# Patient Record
Sex: MALE | Race: WHITE | Employment: FULL TIME | URBAN - NONMETROPOLITAN AREA
[De-identification: names, ages, dates, MRNs, and addresses within clinical notes are randomized per-mention and may not be internally consistent; named-entity substitution may affect disease eponyms.]

---

## 2018-08-03 ENCOUNTER — HOSPITAL ENCOUNTER (EMERGENCY)
Age: 48
Discharge: HOME | End: 2018-08-03

## 2018-08-03 ENCOUNTER — APPOINTMENT (OUTPATIENT)
Dept: CT IMAGING | Age: 48
End: 2018-08-03

## 2018-08-03 ENCOUNTER — APPOINTMENT (OUTPATIENT)
Dept: GENERAL RADIOLOGY | Age: 48
End: 2018-08-03

## 2018-08-03 VITALS
WEIGHT: 275 LBS | HEIGHT: 72 IN | OXYGEN SATURATION: 97 % | TEMPERATURE: 97.9 F | BODY MASS INDEX: 37.25 KG/M2 | DIASTOLIC BLOOD PRESSURE: 100 MMHG | SYSTOLIC BLOOD PRESSURE: 171 MMHG | HEART RATE: 79 BPM | RESPIRATION RATE: 16 BRPM

## 2018-08-03 DIAGNOSIS — R03.0 ELEVATED BLOOD PRESSURE READING WITHOUT DIAGNOSIS OF HYPERTENSION: ICD-10-CM

## 2018-08-03 DIAGNOSIS — R10.32 LEFT GROIN PAIN: Primary | ICD-10-CM

## 2018-08-03 LAB
ANION GAP SERPL CALCULATED.3IONS-SCNC: 13 MEQ/L (ref 8–16)
BASOPHILS # BLD: 0.6 %
BASOPHILS ABSOLUTE: 0 THOU/MM3 (ref 0–0.1)
BUN BLDV-MCNC: 10 MG/DL (ref 7–22)
CALCIUM SERPL-MCNC: 9.3 MG/DL (ref 8.5–10.5)
CHLORIDE BLD-SCNC: 103 MEQ/L (ref 98–111)
CO2: 25 MEQ/L (ref 23–33)
CREAT SERPL-MCNC: 0.9 MG/DL (ref 0.4–1.2)
EOSINOPHIL # BLD: 3.1 %
EOSINOPHILS ABSOLUTE: 0.2 THOU/MM3 (ref 0–0.4)
ERYTHROCYTE [DISTWIDTH] IN BLOOD BY AUTOMATED COUNT: 12.7 % (ref 11.5–14.5)
ERYTHROCYTE [DISTWIDTH] IN BLOOD BY AUTOMATED COUNT: 42.7 FL (ref 35–45)
GFR SERPL CREATININE-BSD FRML MDRD: > 90 ML/MIN/1.73M2
GLUCOSE BLD-MCNC: 104 MG/DL (ref 70–108)
HCT VFR BLD CALC: 43 % (ref 42–52)
HEMOGLOBIN: 15.3 GM/DL (ref 14–18)
IMMATURE GRANS (ABS): 0.02 THOU/MM3 (ref 0–0.07)
IMMATURE GRANULOCYTES: 0.3 %
LYMPHOCYTES # BLD: 22.3 %
LYMPHOCYTES ABSOLUTE: 1.5 THOU/MM3 (ref 1–4.8)
MCH RBC QN AUTO: 32.8 PG (ref 26–33)
MCHC RBC AUTO-ENTMCNC: 35.6 GM/DL (ref 32.2–35.5)
MCV RBC AUTO: 92.1 FL (ref 80–94)
MONOCYTES # BLD: 8.8 %
MONOCYTES ABSOLUTE: 0.6 THOU/MM3 (ref 0.4–1.3)
NUCLEATED RED BLOOD CELLS: 0 /100 WBC
OSMOLALITY CALCULATION: 280.6 MOSMOL/KG (ref 275–300)
PLATELET # BLD: 179 THOU/MM3 (ref 130–400)
PMV BLD AUTO: 11.4 FL (ref 9.4–12.4)
POTASSIUM SERPL-SCNC: 4.5 MEQ/L (ref 3.5–5.2)
RBC # BLD: 4.67 MILL/MM3 (ref 4.7–6.1)
SEG NEUTROPHILS: 64.9 %
SEGMENTED NEUTROPHILS ABSOLUTE COUNT: 4.4 THOU/MM3 (ref 1.8–7.7)
SODIUM BLD-SCNC: 141 MEQ/L (ref 135–145)
WBC # BLD: 6.8 THOU/MM3 (ref 4.8–10.8)

## 2018-08-03 PROCEDURE — 85025 COMPLETE CBC W/AUTO DIFF WBC: CPT

## 2018-08-03 PROCEDURE — 74177 CT ABD & PELVIS W/CONTRAST: CPT

## 2018-08-03 PROCEDURE — 80048 BASIC METABOLIC PNL TOTAL CA: CPT

## 2018-08-03 PROCEDURE — 36415 COLL VENOUS BLD VENIPUNCTURE: CPT

## 2018-08-03 PROCEDURE — 6360000004 HC RX CONTRAST MEDICATION: Performed by: PHYSICIAN ASSISTANT

## 2018-08-03 PROCEDURE — 73502 X-RAY EXAM HIP UNI 2-3 VIEWS: CPT

## 2018-08-03 PROCEDURE — 99284 EMERGENCY DEPT VISIT MOD MDM: CPT

## 2018-08-03 RX ORDER — NAPROXEN 500 MG/1
500 TABLET ORAL 2 TIMES DAILY
Qty: 60 TABLET | Refills: 0 | Status: SHIPPED | OUTPATIENT
Start: 2018-08-03 | End: 2018-08-23 | Stop reason: ALTCHOICE

## 2018-08-03 RX ORDER — MORPHINE SULFATE 4 MG/ML
4 INJECTION, SOLUTION INTRAMUSCULAR; INTRAVENOUS ONCE
Status: DISCONTINUED | OUTPATIENT
Start: 2018-08-03 | End: 2018-08-03 | Stop reason: HOSPADM

## 2018-08-03 RX ORDER — ONDANSETRON 4 MG/1
4 TABLET, ORALLY DISINTEGRATING ORAL ONCE
Status: DISCONTINUED | OUTPATIENT
Start: 2018-08-03 | End: 2018-08-03 | Stop reason: HOSPADM

## 2018-08-03 RX ORDER — GABAPENTIN 300 MG/1
300 CAPSULE ORAL 3 TIMES DAILY
Qty: 30 CAPSULE | Refills: 0 | Status: SHIPPED | OUTPATIENT
Start: 2018-08-03 | End: 2018-08-23 | Stop reason: ALTCHOICE

## 2018-08-03 RX ADMIN — IOPAMIDOL 80 ML: 755 INJECTION, SOLUTION INTRAVENOUS at 14:20

## 2018-08-03 ASSESSMENT — ENCOUNTER SYMPTOMS
WHEEZING: 0
SHORTNESS OF BREATH: 0
RHINORRHEA: 0
DIARRHEA: 0
NAUSEA: 0
COUGH: 0
SORE THROAT: 0
CONSTIPATION: 0
BLOOD IN STOOL: 0
ABDOMINAL PAIN: 0
VOMITING: 0
BACK PAIN: 1

## 2018-08-03 ASSESSMENT — PAIN DESCRIPTION - ORIENTATION: ORIENTATION: LEFT

## 2018-08-03 ASSESSMENT — PAIN DESCRIPTION - DESCRIPTORS: DESCRIPTORS: ACHING;SHARP

## 2018-08-03 ASSESSMENT — PAIN DESCRIPTION - LOCATION: LOCATION: HIP;LEG;GROIN

## 2018-08-03 ASSESSMENT — PAIN SCALES - GENERAL: PAINLEVEL_OUTOF10: 10

## 2018-08-03 ASSESSMENT — PAIN DESCRIPTION - PAIN TYPE: TYPE: ACUTE PAIN

## 2018-08-03 NOTE — ED NOTES
Pt presents to ED and states that for the last couple of weeks he has had this really bad pain that starts in his left groin and shoots down his leg. States when he wakes up in the morning is when it is the worst. Has a hx of hernia repair with mesh about 5 years ago.       Luisa Serrato RN  08/03/18 7081

## 2018-08-03 NOTE — ED PROVIDER NOTES
and breath sounds normal. No respiratory distress. He has no wheezes. He has no rales. Abdominal: Soft. Bowel sounds are normal. He exhibits no distension. There is no tenderness. There is no rebound. No hernia. Hernia confirmed negative in the left inguinal area. Tenderness left lateral groin. No swelling or bulging. Musculoskeletal: Normal range of motion. He exhibits no edema or deformity. Neurological: He is alert and oriented to person, place, and time. Skin: Skin is warm and dry. No erythema. Nursing note and vitals reviewed. DIFFERENTIAL DIAGNOSIS:   Differential diagnosis discussed with the patient and available family at the patient's bedside including but not limited to hernia, strain,      DIAGNOSTIC RESULTS     RADIOLOGY: non-plain film images(s) such as CT, Ultrasound and MRI are read by the radiologist.  @[x] Visualized and interpreted by me  And My attending   [x] Radiologist's Wet Read Report Reviewed   [] Discussed with Radiologist.  [] Will be officially read by the radiologist at a later time. A Wet Read was entered by me. The patient had a   XR HIP LEFT (2-3 VIEWS)   Final Result   No acute fracture or dislocation. **This report has been created using voice recognition software. It may contain minor errors which are inherent in voice recognition technology. **      Final report electronically signed by Dr. Cece Boyd on 8/3/2018 3:42 PM      CT ABDOMEN PELVIS W IV CONTRAST Additional Contrast? Radiologist Recommendation   Final Result   Minimal heterogeneous increased density in the left inguinal canal which may simply represent fibrotic scarring post surgery. No other mass or abnormality identified. **This report has been created using voice recognition software. It may contain minor errors which are inherent in voice recognition technology. **      Final report electronically signed by Dr. Tiny Weber on 8/3/2018 3:12 PM           LABS:   Results department. Patient's laboratory values, imaging studies and physical examination findings were all reassuring. The patient was treated with Zofran and Morphine while in the ED. Upon re-evaluation, the patient is feeling better with a benign repeat examination. Patient was comfortable with the plan of discharge home to followup with the primary care provider and the provided general surgeon in the next 2 days. The patient is instructed to return to the emergency department for any worsening of their symptoms. Patient was discharged from the emergency department in good condition with all questions answered. See disposition below      Medications   morphine injection 4 mg (4 mg Intravenous Not Given 8/3/18 1526)   ondansetron (ZOFRAN-ODT) disintegrating tablet 4 mg (4 mg Oral Not Given 8/3/18 1527)   iopamidol (ISOVUE-370) 76 % injection 80 mL (80 mLs Intravenous Given 8/3/18 1420)       CRITICAL CARE:   None    CONSULTS:  None    PROCEDURES:  None    FINAL IMPRESSION      1. Left groin pain    2. Elevated blood pressure reading without diagnosis of hypertension          DISPOSITION/PLAN   Discharged     PATIENT REFERRED TO:  Jean Paul Higgins MD  [address]  [phone]    Schedule an appointment as soon as possible for a visit in 1 week        DISCHARGE MEDICATIONS:  New Prescriptions    GABAPENTIN (NEURONTIN) 300 MG CAPSULE    Take 1 capsule by mouth 3 times daily for 10 days. Demetria Lima NAPROXEN (NAPROSYN) 500 MG TABLET    Take 1 tablet by mouth 2 times daily       (Please note that portions of this note were completed with a voice recognition program.  Efforts were made to edit the dictations but occasionally words are mis-transcribed.)    This patient was seen independently by Calixto Brody PA-C a Mid-Level Provider in the [facility] Emergency Department. The patient was given an opportunity to see the Emergency Attending.  The patient voiced understanding that I was a Mid-Level Provider and was in agreement with being seen independently by myself. Signed by: Aruna Gaytan, 08/03/18 4:28 PM    Scribe:  Renate Moy 8/3/18 12:50 PM Scribing for and in the presence of Chayito Lyles PA-C. Provider:  I personally performed the services described in the documentation, reviewed and edited the documentation which was dictated to the scribe in my presence, and it accurately records my words and actions.     Chayito Lyles PA-C 8/3/18 4:28 PM            VALDO Iraheta  08/03/18 1715

## 2018-08-03 NOTE — ED NOTES
Pt sitting up and chair updated on plan of care.  Pt states he does not follow with a family dr and hasnt had his BP checked in a while      Amrita Keith RN  08/03/18 9584

## 2018-08-23 ENCOUNTER — OFFICE VISIT (OUTPATIENT)
Dept: SURGERY | Age: 48
End: 2018-08-23

## 2018-08-23 VITALS
BODY MASS INDEX: 40.71 KG/M2 | RESPIRATION RATE: 20 BRPM | WEIGHT: 300.6 LBS | SYSTOLIC BLOOD PRESSURE: 130 MMHG | TEMPERATURE: 96.4 F | OXYGEN SATURATION: 94 % | DIASTOLIC BLOOD PRESSURE: 62 MMHG | HEIGHT: 72 IN | HEART RATE: 74 BPM

## 2018-08-23 DIAGNOSIS — R10.32 INGUINAL PAIN, LEFT: Primary | ICD-10-CM

## 2018-08-23 PROCEDURE — 99213 OFFICE O/P EST LOW 20 MIN: CPT | Performed by: SURGERY

## 2018-08-23 ASSESSMENT — ENCOUNTER SYMPTOMS
SORE THROAT: 0
SHORTNESS OF BREATH: 0
COLOR CHANGE: 0
ABDOMINAL DISTENTION: 0
SINUS PAIN: 0
ABDOMINAL PAIN: 0
FACIAL SWELLING: 0
RHINORRHEA: 0
SINUS PRESSURE: 0
COUGH: 0
PHOTOPHOBIA: 0
EYE DISCHARGE: 0
BLOOD IN STOOL: 0
APNEA: 0
VOICE CHANGE: 0
WHEEZING: 0
CHEST TIGHTNESS: 0
ANAL BLEEDING: 0
BACK PAIN: 0
EYE ITCHING: 0
TROUBLE SWALLOWING: 0
EYE PAIN: 0
CONSTIPATION: 0
CHOKING: 0
STRIDOR: 0
EYE REDNESS: 0

## 2018-08-23 NOTE — PROGRESS NOTES
agitation, behavioral problems, confusion, decreased concentration, dysphoric mood, hallucinations, self-injury, sleep disturbance and suicidal ideas. The patient is not nervous/anxious and is not hyperactive. Objective:   /62 (Site: Left Arm, Position: Sitting, Cuff Size: Medium Adult)   Pulse 74   Temp 96.4 °F (35.8 °C) (Tympanic)   Resp 20   Ht 6' (1.829 m)   Wt (!) 300 lb 9.6 oz (136.4 kg)   SpO2 94%   BMI 40.77 kg/m²     Physical Exam   Constitutional: He is oriented to person, place, and time. He appears well-developed and well-nourished. HENT:   Head: Normocephalic and atraumatic. Eyes: Pupils are equal, round, and reactive to light. Conjunctivae and EOM are normal. Left eye exhibits no discharge. No scleral icterus. Neck: No tracheal deviation present. No thyromegaly present. Cardiovascular: Normal rate, regular rhythm and normal heart sounds. Exam reveals no gallop and no friction rub. No murmur heard. Pulmonary/Chest: Effort normal and breath sounds normal. No respiratory distress. He has no wheezes. He has no rales. He exhibits no tenderness. Abdominal: He exhibits no distension. There is no tenderness. There is no rebound. No evidence of recurrent left inguinal hernia   Musculoskeletal: Normal range of motion. He exhibits no edema or tenderness. Lymphadenopathy:     He has no cervical adenopathy. Neurological: He is alert and oriented to person, place, and time. Skin: Skin is warm and dry. No rash noted. No erythema. No pallor. Psychiatric: He has a normal mood and affect.  His behavior is normal. Judgment and thought content normal.       Results for orders placed or performed during the hospital encounter of 08/03/18   CBC auto differential   Result Value Ref Range    WBC 6.8 4.8 - 10.8 thou/mm3    RBC 4.67 (L) 4.70 - 6.10 mill/mm3    Hemoglobin 15.3 14.0 - 18.0 gm/dl    Hematocrit 43.0 42.0 - 52.0 %    MCV 92.1 80.0 - 94.0 fL    MCH 32.8 26.0 - 33.0 pg

## 2019-04-11 ENCOUNTER — HOSPITAL ENCOUNTER (EMERGENCY)
Age: 49
Discharge: HOME | End: 2019-04-11

## 2019-04-11 VITALS
TEMPERATURE: 98.3 F | WEIGHT: 296 LBS | BODY MASS INDEX: 40.09 KG/M2 | HEART RATE: 76 BPM | HEIGHT: 72 IN | OXYGEN SATURATION: 98 % | RESPIRATION RATE: 16 BRPM | SYSTOLIC BLOOD PRESSURE: 145 MMHG | DIASTOLIC BLOOD PRESSURE: 79 MMHG

## 2019-04-11 DIAGNOSIS — S86.912A STRAIN OF LEFT KNEE, INITIAL ENCOUNTER: Primary | ICD-10-CM

## 2019-04-11 PROCEDURE — 99212 OFFICE O/P EST SF 10 MIN: CPT

## 2019-04-11 PROCEDURE — 99213 OFFICE O/P EST LOW 20 MIN: CPT | Performed by: NURSE PRACTITIONER

## 2019-04-11 RX ORDER — IBUPROFEN 600 MG/1
600 TABLET ORAL EVERY 6 HOURS PRN
COMMUNITY
End: 2021-06-23

## 2019-04-11 ASSESSMENT — PAIN SCALES - GENERAL: PAINLEVEL_OUTOF10: 2

## 2019-04-11 ASSESSMENT — PAIN DESCRIPTION - ORIENTATION: ORIENTATION: LEFT

## 2019-04-11 ASSESSMENT — ENCOUNTER SYMPTOMS
CHEST TIGHTNESS: 0
COLOR CHANGE: 0
SHORTNESS OF BREATH: 0

## 2019-04-11 ASSESSMENT — PAIN DESCRIPTION - FREQUENCY: FREQUENCY: CONTINUOUS

## 2019-04-11 ASSESSMENT — PAIN DESCRIPTION - LOCATION: LOCATION: KNEE

## 2019-04-11 ASSESSMENT — PAIN DESCRIPTION - PAIN TYPE: TYPE: ACUTE PAIN

## 2019-04-11 NOTE — ED PROVIDER NOTES
[facility]  Urgent Care Encounter       CHIEF COMPLAINT       Chief Complaint   Patient presents with    Knee Pain    Joint Swelling     right       Nurses Notes reviewed and I agree except as noted in the HPI. HISTORY OF PRESENT ILLNESS   Gilmar Robledo is a 57 y.o. male who presents for evaluation of left knee pain and swelling. Patient reports that this swelling and pain seems to be moving into his inner thigh as well. He denies any redness or heat to touch. He denies any known injury to the knee, however he states that this is been ongoing issue for over 4 weeks. The history is provided by the patient. REVIEW OF SYSTEMS     Review of Systems   Constitutional: Negative for chills and fever. Respiratory: Negative for chest tightness and shortness of breath. Cardiovascular: Negative for chest pain. Musculoskeletal: Positive for arthralgias and joint swelling. Negative for myalgias. Skin: Negative for color change, rash and wound. PAST MEDICAL HISTORY         Diagnosis Date    Hernia 2009    inguinal hernia       SURGICALHISTORY     Patient  has a past surgical history that includes Inguinal hernia repair (Left, 09/16/2013). CURRENT MEDICATIONS       Previous Medications    IBUPROFEN (ADVIL;MOTRIN) 600 MG TABLET    Take 600 mg by mouth every 6 hours as needed for Pain    MULTIPLE VITAMINS-MINERALS (MULTIVITAMIN ADULT PO)    Take 1 tablet by mouth daily       ALLERGIES     Patient is has No Known Allergies. Patients   There is no immunization history on file for this patient. FAMILY HISTORY     Patient's family history includes COPD in his mother; Heart Attack in his father; Heart Disease in his father; High Blood Pressure in his mother; Obesity in his mother. SOCIAL HISTORY     Patient  reports that he has been smoking cigarettes. He has a 10.00 pack-year smoking history. He has never used smokeless tobacco. He reports that he drinks alcohol.  He reports that he has current or past drug history. Drug: Marijuana. PHYSICAL EXAM     ED TRIAGE VITALS  BP: (!) 145/79, Temp: 98.3 °F (36.8 °C), Pulse: 76, Resp: 16, SpO2: 98 %,Estimated body mass index is 40.14 kg/m² as calculated from the following:    Height as of this encounter: 6' (1.829 m). Weight as of this encounter: 296 lb (134.3 kg). ,No LMP for male patient. Physical Exam   Constitutional: He is oriented to person, place, and time. He appears well-developed and well-nourished. No distress. Pulmonary/Chest: Effort normal. No respiratory distress. Musculoskeletal:        Left knee: He exhibits swelling (mild anterior). He exhibits normal range of motion and no ecchymosis. Tenderness found. Neurological: He is alert and oriented to person, place, and time. No cranial nerve deficit. Skin: Skin is warm and dry. No rash noted. He is not diaphoretic. Psychiatric: He has a normal mood and affect. Nursing note and vitals reviewed. DIAGNOSTIC RESULTS     Labs:No results found for this visit on 04/11/19. IMAGING:    No orders to display         EKG:  none    URGENT CARE COURSE:     Vitals:    04/11/19 1713   BP: (!) 145/79   Pulse: 76   Resp: 16   Temp: 98.3 °F (36.8 °C)   TempSrc: Temporal   SpO2: 98%   Weight: 296 lb (134.3 kg)   Height: 6' (1.829 m)       Medications - No data to display         PROCEDURES:  None    FINAL IMPRESSION      1. Strain of left knee, initial encounter          DISPOSITION/ PLAN       Physical exam does not reveal any excessive swelling to the thigh at this time. Discussed with the patient that we could do an x-ray of his knee, however I believe that this will be negative due to his lack of injury and the length of the symptoms. Discussed that this is likely more of a tendon type injury. Patient states that he currently does not have insurance and would prefer to not have an x-ray at this time.   Discussed the plan to provide an Ace wrap and for him to ice and rest as much as possible at home. He is instructed to use ibuprofen twice a day for the next 2 weeks. Patient states that he has an Ace wrap at home and will use this and begin icing. He is advised to [facility] [place] if symptoms do not improve or present directly to the ER. He has any worsening symptoms. He is agreeable to plan as discussed. PATIENT REFERRED TO:  No primary care provider on file. No primary physician on file.       DISCHARGE MEDICATIONS:  New Prescriptions    No medications on file       Discontinued Medications    No medications on file       Current Discharge Medication List          MENDEZ Alegria CNP    (Please note that portions of this note were completed with a voice recognition program. Efforts were made to edit the dictations but occasionally words are mis-transcribed.)          MENDEZ Alegria CNP  04/11/19 8443

## 2020-05-26 ENCOUNTER — HOSPITAL ENCOUNTER (EMERGENCY)
Age: 50
Discharge: HOME | End: 2020-05-27

## 2020-05-26 PROCEDURE — 82248 BILIRUBIN DIRECT: CPT

## 2020-05-26 PROCEDURE — 83690 ASSAY OF LIPASE: CPT

## 2020-05-26 PROCEDURE — 36415 COLL VENOUS BLD VENIPUNCTURE: CPT

## 2020-05-26 PROCEDURE — 85025 COMPLETE CBC W/AUTO DIFF WBC: CPT

## 2020-05-26 PROCEDURE — 80053 COMPREHEN METABOLIC PANEL: CPT

## 2020-05-26 PROCEDURE — 99283 EMERGENCY DEPT VISIT LOW MDM: CPT

## 2020-05-26 RX ORDER — KETOROLAC TROMETHAMINE 30 MG/ML
30 INJECTION, SOLUTION INTRAMUSCULAR; INTRAVENOUS ONCE
Status: DISCONTINUED | OUTPATIENT
Start: 2020-05-27 | End: 2020-05-27 | Stop reason: HOSPADM

## 2020-05-26 RX ORDER — 0.9 % SODIUM CHLORIDE 0.9 %
1000 INTRAVENOUS SOLUTION INTRAVENOUS ONCE
Status: COMPLETED | OUTPATIENT
Start: 2020-05-27 | End: 2020-05-27

## 2020-05-26 ASSESSMENT — ENCOUNTER SYMPTOMS
NAUSEA: 0
BACK PAIN: 0
SHORTNESS OF BREATH: 0
COUGH: 0
SORE THROAT: 0
ABDOMINAL PAIN: 1
VOMITING: 0
RHINORRHEA: 0
DIARRHEA: 0

## 2020-05-26 ASSESSMENT — PAIN DESCRIPTION - FREQUENCY: FREQUENCY: CONTINUOUS

## 2020-05-26 ASSESSMENT — PAIN DESCRIPTION - ONSET: ONSET: AWAKENED FROM SLEEP

## 2020-05-26 ASSESSMENT — PAIN DESCRIPTION - ORIENTATION: ORIENTATION: LEFT

## 2020-05-26 ASSESSMENT — PAIN SCALES - GENERAL: PAINLEVEL_OUTOF10: 5

## 2020-05-26 ASSESSMENT — PAIN DESCRIPTION - DESCRIPTORS: DESCRIPTORS: CRAMPING;PRESSURE

## 2020-05-26 ASSESSMENT — PAIN DESCRIPTION - LOCATION: LOCATION: ABDOMEN

## 2020-05-27 ENCOUNTER — APPOINTMENT (OUTPATIENT)
Dept: CT IMAGING | Age: 50
End: 2020-05-27

## 2020-05-27 VITALS
OXYGEN SATURATION: 95 % | HEART RATE: 67 BPM | BODY MASS INDEX: 38.6 KG/M2 | SYSTOLIC BLOOD PRESSURE: 183 MMHG | TEMPERATURE: 98.8 F | DIASTOLIC BLOOD PRESSURE: 108 MMHG | RESPIRATION RATE: 16 BRPM | HEIGHT: 72 IN | WEIGHT: 285 LBS

## 2020-05-27 LAB
ALBUMIN SERPL-MCNC: 4.3 G/DL (ref 3.5–5.1)
ALP BLD-CCNC: 85 U/L (ref 38–126)
ALT SERPL-CCNC: 17 U/L (ref 11–66)
ANION GAP SERPL CALCULATED.3IONS-SCNC: 13 MEQ/L (ref 8–16)
AST SERPL-CCNC: 18 U/L (ref 5–40)
BACTERIA: ABNORMAL /HPF
BASOPHILS # BLD: 0.7 %
BASOPHILS ABSOLUTE: 0.1 THOU/MM3 (ref 0–0.1)
BILIRUB SERPL-MCNC: 0.5 MG/DL (ref 0.3–1.2)
BILIRUBIN DIRECT: < 0.2 MG/DL (ref 0–0.3)
BILIRUBIN URINE: NEGATIVE
BLOOD, URINE: ABNORMAL
BUN BLDV-MCNC: 10 MG/DL (ref 7–22)
CALCIUM SERPL-MCNC: 9.3 MG/DL (ref 8.5–10.5)
CASTS 2: ABNORMAL /LPF
CASTS UA: ABNORMAL /LPF
CHARACTER, URINE: CLEAR
CHLORIDE BLD-SCNC: 101 MEQ/L (ref 98–111)
CO2: 25 MEQ/L (ref 23–33)
COLOR: YELLOW
CREAT SERPL-MCNC: 0.9 MG/DL (ref 0.4–1.2)
CRYSTALS, UA: ABNORMAL
EOSINOPHIL # BLD: 2.3 %
EOSINOPHILS ABSOLUTE: 0.2 THOU/MM3 (ref 0–0.4)
EPITHELIAL CELLS, UA: ABNORMAL /HPF
ERYTHROCYTE [DISTWIDTH] IN BLOOD BY AUTOMATED COUNT: 13.1 % (ref 11.5–14.5)
ERYTHROCYTE [DISTWIDTH] IN BLOOD BY AUTOMATED COUNT: 44.4 FL (ref 35–45)
GFR SERPL CREATININE-BSD FRML MDRD: > 90 ML/MIN/1.73M2
GLUCOSE BLD-MCNC: 110 MG/DL (ref 70–108)
GLUCOSE URINE: NEGATIVE MG/DL
HCT VFR BLD CALC: 43.1 % (ref 42–52)
HEMOGLOBIN: 15.1 GM/DL (ref 14–18)
IMMATURE GRANS (ABS): 0.02 THOU/MM3 (ref 0–0.07)
IMMATURE GRANULOCYTES: 0.2 %
KETONES, URINE: ABNORMAL
LEUKOCYTE ESTERASE, URINE: ABNORMAL
LIPASE: 22.4 U/L (ref 5.6–51.3)
LYMPHOCYTES # BLD: 20.4 %
LYMPHOCYTES ABSOLUTE: 1.7 THOU/MM3 (ref 1–4.8)
MCH RBC QN AUTO: 32.5 PG (ref 26–33)
MCHC RBC AUTO-ENTMCNC: 35 GM/DL (ref 32.2–35.5)
MCV RBC AUTO: 92.9 FL (ref 80–94)
MISCELLANEOUS 2: ABNORMAL
MONOCYTES # BLD: 7.7 %
MONOCYTES ABSOLUTE: 0.6 THOU/MM3 (ref 0.4–1.3)
NITRITE, URINE: NEGATIVE
NUCLEATED RED BLOOD CELLS: 0 /100 WBC
OSMOLALITY CALCULATION: 277.2 MOSMOL/KG (ref 275–300)
PH UA: 6 (ref 5–9)
PLATELET # BLD: 178 THOU/MM3 (ref 130–400)
PMV BLD AUTO: 11 FL (ref 9.4–12.4)
POTASSIUM SERPL-SCNC: 3.6 MEQ/L (ref 3.5–5.2)
PROTEIN UA: ABNORMAL
RBC # BLD: 4.64 MILL/MM3 (ref 4.7–6.1)
RBC URINE: ABNORMAL /HPF
RENAL EPITHELIAL, UA: ABNORMAL
SEG NEUTROPHILS: 68.7 %
SEGMENTED NEUTROPHILS ABSOLUTE COUNT: 5.6 THOU/MM3 (ref 1.8–7.7)
SODIUM BLD-SCNC: 139 MEQ/L (ref 135–145)
SPECIFIC GRAVITY, URINE: 1.02 (ref 1–1.03)
TOTAL PROTEIN: 7 G/DL (ref 6.1–8)
UROBILINOGEN, URINE: 1 EU/DL (ref 0–1)
WBC # BLD: 8.2 THOU/MM3 (ref 4.8–10.8)
WBC UA: ABNORMAL /HPF
YEAST: ABNORMAL

## 2020-05-27 PROCEDURE — 2580000003 HC RX 258: Performed by: NURSE PRACTITIONER

## 2020-05-27 PROCEDURE — 81001 URINALYSIS AUTO W/SCOPE: CPT

## 2020-05-27 PROCEDURE — 74176 CT ABD & PELVIS W/O CONTRAST: CPT

## 2020-05-27 PROCEDURE — 87086 URINE CULTURE/COLONY COUNT: CPT

## 2020-05-27 RX ORDER — CYCLOBENZAPRINE HCL 10 MG
10 TABLET ORAL 3 TIMES DAILY PRN
Qty: 12 TABLET | Refills: 0 | Status: SHIPPED | OUTPATIENT
Start: 2020-05-27 | End: 2020-06-06

## 2020-05-27 RX ADMIN — SODIUM CHLORIDE 1000 ML: 9 INJECTION, SOLUTION INTRAVENOUS at 00:03

## 2020-05-27 NOTE — ED PROVIDER NOTES
reading in the emergency department. I recommend that the patient call the primary care provider listed on their discharge instructions or a physician of their choice this week to arrange follow up for further evaluation of possible pre-hypertension or hypertension. Strict return precautions and follow up instructions were discussed with the patient with which the patient agrees     Physical assessment findings, diagnostic testing(s) if applicable, and vital signs reviewed with patient/patient representative. Questions answered. Medications asdirected, including OTC medications for supportive care. Education provided on medications. Differential diagnosis(s) discussed with patient/patient representative. Home care/self care instructions reviewed withpatient/patient representative. Patient is to follow-up with family care provider in 2-3 days if no improvement. Patient is to go to the emergency department if symptoms worsen. Patient/patient representative isaware of care plan, questions answered, verbalizes understanding and is in agreement. ED Medications administered this visit:   Medications   0.9 % sodium chloride bolus (0 mLs Intravenous Stopped 5/27/20 0202)           I have given the patient strict written and verbal instructions about care at home,follow-up, and signs and symptoms of worsening of condition and they did verbalize understanding. Patient was seen independently by myself. The Patient's final impression and disposition and plan was determined by myself. CRITICAL CARE:   none    CONSULTS:  None    PROCEDURES:  None    FINAL IMPRESSION      1. Flank pain    2. Elevated blood pressure reading    3. Muscle strain          DISPOSITION/PLAN   DISPOSITION Decision To Discharge 05/27/2020 02:20:03 AM        PATIENT REFERRED TO:  [address] [place] [address],[address]  [address] [address] [facility] [address]  Call today  For follow up      DISCHARGE MEDICATIONS:  Discharge Medication List as of 5/27/2020  2:21 AM      START taking these medications    Details   cyclobenzaprine (FLEXERIL) 10 MG tablet Take 1 tablet by mouth 3 times daily as needed for Muscle spasms, Disp-12 tablet, R-0Print             (Please note that portions of this note were completed with a voice recognition program.  Efforts were made to edit thedictations but occasionally words are mis-transcribed.)    MENDEZ Montgomery CNP, APRN - CNP  05/28/20 5192

## 2020-05-28 LAB
ORGANISM: ABNORMAL
URINE CULTURE REFLEX: ABNORMAL

## 2020-05-29 ENCOUNTER — VIRTUAL VISIT (OUTPATIENT)
Dept: FAMILY MEDICINE CLINIC | Age: 50
End: 2020-05-29

## 2020-05-29 PROCEDURE — 99203 OFFICE O/P NEW LOW 30 MIN: CPT | Performed by: NURSE PRACTITIONER

## 2020-05-29 RX ORDER — LISINOPRIL 10 MG/1
10 TABLET ORAL DAILY
Qty: 90 TABLET | Refills: 3 | Status: SHIPPED | OUTPATIENT
Start: 2020-05-29 | End: 2021-06-23 | Stop reason: SDUPTHER

## 2020-05-29 ASSESSMENT — ENCOUNTER SYMPTOMS
DIARRHEA: 0
ABDOMINAL DISTENTION: 0
EYE DISCHARGE: 0
EYE PAIN: 0
COUGH: 0
CONSTIPATION: 0
TROUBLE SWALLOWING: 0
ABDOMINAL PAIN: 1
PHOTOPHOBIA: 0
BLOOD IN STOOL: 0
SHORTNESS OF BREATH: 1
NAUSEA: 0
SORE THROAT: 0
VOMITING: 0

## 2020-05-29 ASSESSMENT — PATIENT HEALTH QUESTIONNAIRE - PHQ9
1. LITTLE INTEREST OR PLEASURE IN DOING THINGS: 1
SUM OF ALL RESPONSES TO PHQ QUESTIONS 1-9: 1
SUM OF ALL RESPONSES TO PHQ9 QUESTIONS 1 & 2: 1
2. FEELING DOWN, DEPRESSED OR HOPELESS: 0
SUM OF ALL RESPONSES TO PHQ QUESTIONS 1-9: 1

## 2020-05-29 NOTE — PATIENT INSTRUCTIONS
weather, or by not drinking enough fluids. Lisinopril can decrease sweating and you may be more prone to heat stroke. Do not use potassium supplements or salt substitutes, unless your doctor has told you to. Avoid getting up too fast from a sitting or lying position, or you may feel dizzy. What are the possible side effects of lisinopril? Get emergency medical help if you have signs of an allergic reaction: hives; severe stomach pain; difficulty breathing; swelling of your face, lips, tongue, or throat. You may be more likely to have an allergic reaction if you are -American. Call your doctor at once if you have:  · a light-headed feeling, like you might pass out;  · fever, sore throat;  · high potassium --nausea, weakness, tingly feeling, chest pain, irregular heartbeats, loss of movement;  · kidney problems --little or no urination, swelling in your feet or ankles, feeling tired or short of breath; or  · liver problems --nausea, upper stomach pain, itching, tired feeling, loss of appetite, dark urine, jose-colored stools, jaundice (yellowing of the skin or eyes). Common side effects may include:  · headache, dizziness;  · cough; or  · chest pain. This is not a complete list of side effects and others may occur. Call your doctor for medical advice about side effects. You may report side effects to FDA at 6-062-FDA-2695. What other drugs will affect lisinopril? Tell your doctor about all your other medicines, especially:  · a diuretic or \"water pill\";  · lithium;  · gold injections to treat arthritis;  · insulin or oral diabetes medicine;  · a potassium supplement;  · medicine to prevent organ transplant rejection --everolimus, sirolimus, tacrolimus, temsirolimus; or  · NSAIDs (nonsteroidal anti-inflammatory drugs) --aspirin, ibuprofen (Advil, Motrin), naproxen (Aleve), celecoxib, diclofenac, indomethacin, meloxicam, and others. This list is not complete.  Other drugs may affect lisinopril, including prescription and over-the-counter medicines, vitamins, and herbal products. Not all possible drug interactions are listed here. Where can I get more information? Your pharmacist can provide more information about lisinopril. Remember, keep this and all other medicines out of the reach of children, never share your medicines with others, and use this medication only for the indication prescribed. Every effort has been made to ensure that the information provided by [address] [place] [address]  is accurate, up-to-date, and complete, but no guarantee is made to that effect. Drug information contained herein may be time sensitive. [facility] information has been compiled for use by healthcare practitioners and consumers in the [place] and therefore [facility] does not warrant that uses outside of the [place] are appropriate, unless specifically indicated otherwise. [facility]'s drug information does not endorse drugs, diagnose patients or recommend therapy. [facility]'s drug information is an informational resource designed to assist licensed healthcare practitioners in caring for their patients and/or to serve consumers viewing this service as a supplement to, and not a substitute for, the expertise, skill, knowledge and judgment of healthcare practitioners. The absence of a warning for a given drug or drug combination in no way should be construed to indicate that the drug or drug combination is safe, effective or appropriate for any given patient. [facility] does not assume any responsibility for any aspect of healthcare administered with the aid of information [facility] provides. The information contained herein is not intended to cover all possible uses, directions, precautions, warnings, drug interactions, allergic reactions, or adverse effects. If you have questions about the drugs you are taking, check with your doctor, nurse or pharmacist.  Copyright 3636-7224 [employer] [address] [place] [address] Avenue: 15.03.  Revision date:

## 2020-05-29 NOTE — PROGRESS NOTES
palpitations. Gastrointestinal: Positive for abdominal pain. Negative for abdominal distention, blood in stool, constipation, diarrhea, nausea and vomiting. Genitourinary: Negative for difficulty urinating, dysuria, frequency, hematuria and urgency. Musculoskeletal: Positive for myalgias. Negative for arthralgias, joint swelling, neck pain (legs hurt after standing for long) and neck stiffness. Skin: Negative for rash and wound. Neurological: Negative for dizziness and headaches. Hematological: Negative for adenopathy. Psychiatric/Behavioral: Negative for agitation, self-injury and suicidal ideas. Prior to Visit Medications    Medication Sig Taking?  Authorizing Provider   lisinopril (PRINIVIL;ZESTRIL) 10 MG tablet Take 1 tablet by mouth daily Yes MENDEZ Dorsey - CNP   cyclobenzaprine (FLEXERIL) 10 MG tablet Take 1 tablet by mouth 3 times daily as needed for Muscle spasms  MENDEZ Rodarte - CNP   ibuprofen (ADVIL;MOTRIN) 600 MG tablet Take 600 mg by mouth every 6 hours as needed for Pain  Historical Provider, MD   Multiple Vitamins-Minerals (MULTIVITAMIN ADULT PO) Take 1 tablet by mouth daily  Historical Provider, MD       Social History     Tobacco Use    Smoking status: Current Every Day Smoker     Packs/day: 0.50     Years: 20.00     Pack years: 10.00     Types: Cigarettes    Smokeless tobacco: Never Used   Substance Use Topics    Alcohol use: Yes     Comment: occasional     Drug use: Yes     Types: Marijuana     Comment: occasional         PHYSICAL EXAMINATION:  [ INSTRUCTIONS:  \"[x]\" Indicates a positive item  \"[]\" Indicates a negative item  -- DELETE ALL ITEMS NOT EXAMINED]  Vital Signs: (As obtained by patient/caregiver or practitioner observation)    Constitutional: [x] Appears well-developed and well-nourished [x] No apparent distress      [] Abnormal-   Mental status  [x] Alert and awake  [x] Oriented to person/place/time [x]Able to follow commands      Eyes:  EOM    [x]

## 2021-06-23 ENCOUNTER — APPOINTMENT (OUTPATIENT)
Dept: CT IMAGING | Age: 51
End: 2021-06-23

## 2021-06-23 ENCOUNTER — HOSPITAL ENCOUNTER (EMERGENCY)
Age: 51
Discharge: HOME | End: 2021-06-23
Attending: EMERGENCY MEDICINE

## 2021-06-23 VITALS
TEMPERATURE: 97.8 F | SYSTOLIC BLOOD PRESSURE: 175 MMHG | BODY MASS INDEX: 40.63 KG/M2 | DIASTOLIC BLOOD PRESSURE: 97 MMHG | RESPIRATION RATE: 15 BRPM | HEART RATE: 77 BPM | HEIGHT: 72 IN | OXYGEN SATURATION: 99 % | WEIGHT: 300 LBS

## 2021-06-23 DIAGNOSIS — I10 UNCONTROLLED HYPERTENSION: ICD-10-CM

## 2021-06-23 DIAGNOSIS — Z00.00 WELLNESS EXAMINATION: ICD-10-CM

## 2021-06-23 DIAGNOSIS — N20.0 KIDNEY STONE: Primary | ICD-10-CM

## 2021-06-23 DIAGNOSIS — I10 ESSENTIAL HYPERTENSION: ICD-10-CM

## 2021-06-23 LAB
ALBUMIN SERPL-MCNC: 4.8 G/DL (ref 3.5–5.1)
ALP BLD-CCNC: 95 U/L (ref 38–126)
ALT SERPL-CCNC: 20 U/L (ref 11–66)
AMPHETAMINE+METHAMPHETAMINE URINE SCREEN: NEGATIVE
ANION GAP SERPL CALCULATED.3IONS-SCNC: 15 MEQ/L (ref 8–16)
AST SERPL-CCNC: 18 U/L (ref 5–40)
BACTERIA: ABNORMAL /HPF
BARBITURATE QUANTITATIVE URINE: NEGATIVE
BASOPHILS # BLD: 0.6 %
BASOPHILS ABSOLUTE: 0.1 THOU/MM3 (ref 0–0.1)
BENZODIAZEPINE QUANTITATIVE URINE: NEGATIVE
BILIRUB SERPL-MCNC: 0.5 MG/DL (ref 0.3–1.2)
BILIRUBIN DIRECT: < 0.2 MG/DL (ref 0–0.3)
BILIRUBIN URINE: NEGATIVE
BLOOD, URINE: ABNORMAL
BUN BLDV-MCNC: 12 MG/DL (ref 7–22)
CALCIUM SERPL-MCNC: 9.3 MG/DL (ref 8.5–10.5)
CANNABINOID QUANTITATIVE URINE: POSITIVE
CASTS 2: ABNORMAL /LPF
CASTS UA: ABNORMAL /LPF
CHARACTER, URINE: ABNORMAL
CHLORIDE BLD-SCNC: 102 MEQ/L (ref 98–111)
CO2: 23 MEQ/L (ref 23–33)
COCAINE METABOLITE QUANTITATIVE URINE: NEGATIVE
COLOR: YELLOW
CREAT SERPL-MCNC: 0.9 MG/DL (ref 0.4–1.2)
CRYSTALS, UA: ABNORMAL
EOSINOPHIL # BLD: 2 %
EOSINOPHILS ABSOLUTE: 0.2 THOU/MM3 (ref 0–0.4)
EPITHELIAL CELLS, UA: ABNORMAL /HPF
ERYTHROCYTE [DISTWIDTH] IN BLOOD BY AUTOMATED COUNT: 13.2 % (ref 11.5–14.5)
ERYTHROCYTE [DISTWIDTH] IN BLOOD BY AUTOMATED COUNT: 43.9 FL (ref 35–45)
ETHYL ALCOHOL, SERUM: < 0.01 %
GFR SERPL CREATININE-BSD FRML MDRD: 90 ML/MIN/1.73M2
GLUCOSE BLD-MCNC: 131 MG/DL (ref 70–108)
GLUCOSE URINE: NEGATIVE MG/DL
HCT VFR BLD CALC: 44.5 % (ref 42–52)
HEMOGLOBIN: 15.4 GM/DL (ref 14–18)
IMMATURE GRANS (ABS): 0.03 THOU/MM3 (ref 0–0.07)
IMMATURE GRANULOCYTES: 0.3 %
KETONES, URINE: NEGATIVE
LEUKOCYTE ESTERASE, URINE: NEGATIVE
LIPASE: 30.3 U/L (ref 5.6–51.3)
LYMPHOCYTES # BLD: 18.5 %
LYMPHOCYTES ABSOLUTE: 2 THOU/MM3 (ref 1–4.8)
MAGNESIUM: 2 MG/DL (ref 1.6–2.4)
MCH RBC QN AUTO: 31.7 PG (ref 26–33)
MCHC RBC AUTO-ENTMCNC: 34.6 GM/DL (ref 32.2–35.5)
MCV RBC AUTO: 91.6 FL (ref 80–94)
MISCELLANEOUS 2: ABNORMAL
MONOCYTES # BLD: 7.5 %
MONOCYTES ABSOLUTE: 0.8 THOU/MM3 (ref 0.4–1.3)
NITRITE, URINE: NEGATIVE
NUCLEATED RED BLOOD CELLS: 0 /100 WBC
OPIATES, URINE: NEGATIVE
OSMOLALITY CALCULATION: 281 MOSMOL/KG (ref 275–300)
OXYCODONE: NEGATIVE
PH UA: 7.5 (ref 5–9)
PHENCYCLIDINE QUANTITATIVE URINE: NEGATIVE
PLATELET # BLD: 191 THOU/MM3 (ref 130–400)
PMV BLD AUTO: 10.8 FL (ref 9.4–12.4)
POTASSIUM SERPL-SCNC: 3.5 MEQ/L (ref 3.5–5.2)
PRO-BNP: 161.3 PG/ML (ref 0–450)
PROTEIN UA: 30
RBC # BLD: 4.86 MILL/MM3 (ref 4.7–6.1)
RBC URINE: > 200 /HPF
RENAL EPITHELIAL, UA: ABNORMAL
SEG NEUTROPHILS: 71.1 %
SEGMENTED NEUTROPHILS ABSOLUTE COUNT: 7.7 THOU/MM3 (ref 1.8–7.7)
SODIUM BLD-SCNC: 140 MEQ/L (ref 135–145)
SPECIFIC GRAVITY, URINE: 1.01 (ref 1–1.03)
TOTAL PROTEIN: 7.4 G/DL (ref 6.1–8)
TROPONIN T: < 0.01 NG/ML
UROBILINOGEN, URINE: 0.2 EU/DL (ref 0–1)
WBC # BLD: 10.9 THOU/MM3 (ref 4.8–10.8)
WBC UA: ABNORMAL /HPF
YEAST: ABNORMAL

## 2021-06-23 PROCEDURE — 2580000003 HC RX 258: Performed by: EMERGENCY MEDICINE

## 2021-06-23 PROCEDURE — 6360000004 HC RX CONTRAST MEDICATION: Performed by: EMERGENCY MEDICINE

## 2021-06-23 PROCEDURE — 85025 COMPLETE CBC W/AUTO DIFF WBC: CPT

## 2021-06-23 PROCEDURE — 80053 COMPREHEN METABOLIC PANEL: CPT

## 2021-06-23 PROCEDURE — 96375 TX/PRO/DX INJ NEW DRUG ADDON: CPT

## 2021-06-23 PROCEDURE — 36415 COLL VENOUS BLD VENIPUNCTURE: CPT

## 2021-06-23 PROCEDURE — 96374 THER/PROPH/DIAG INJ IV PUSH: CPT

## 2021-06-23 PROCEDURE — 82248 BILIRUBIN DIRECT: CPT

## 2021-06-23 PROCEDURE — 99285 EMERGENCY DEPT VISIT HI MDM: CPT

## 2021-06-23 PROCEDURE — 83880 ASSAY OF NATRIURETIC PEPTIDE: CPT

## 2021-06-23 PROCEDURE — 82077 ASSAY SPEC XCP UR&BREATH IA: CPT

## 2021-06-23 PROCEDURE — 6370000000 HC RX 637 (ALT 250 FOR IP): Performed by: EMERGENCY MEDICINE

## 2021-06-23 PROCEDURE — 93005 ELECTROCARDIOGRAM TRACING: CPT | Performed by: EMERGENCY MEDICINE

## 2021-06-23 PROCEDURE — 83735 ASSAY OF MAGNESIUM: CPT

## 2021-06-23 PROCEDURE — 74177 CT ABD & PELVIS W/CONTRAST: CPT

## 2021-06-23 PROCEDURE — 6360000002 HC RX W HCPCS: Performed by: EMERGENCY MEDICINE

## 2021-06-23 PROCEDURE — 2500000003 HC RX 250 WO HCPCS: Performed by: EMERGENCY MEDICINE

## 2021-06-23 PROCEDURE — 81001 URINALYSIS AUTO W/SCOPE: CPT

## 2021-06-23 PROCEDURE — 84484 ASSAY OF TROPONIN QUANT: CPT

## 2021-06-23 PROCEDURE — 83690 ASSAY OF LIPASE: CPT

## 2021-06-23 PROCEDURE — 80307 DRUG TEST PRSMV CHEM ANLYZR: CPT

## 2021-06-23 RX ORDER — MORPHINE SULFATE 4 MG/ML
4 INJECTION, SOLUTION INTRAMUSCULAR; INTRAVENOUS ONCE
Status: COMPLETED | OUTPATIENT
Start: 2021-06-23 | End: 2021-06-23

## 2021-06-23 RX ORDER — METOPROLOL TARTRATE 5 MG/5ML
5 INJECTION INTRAVENOUS ONCE
Status: COMPLETED | OUTPATIENT
Start: 2021-06-23 | End: 2021-06-23

## 2021-06-23 RX ORDER — IBUPROFEN 600 MG/1
600 TABLET ORAL EVERY 6 HOURS PRN
Qty: 40 TABLET | Refills: 0 | Status: SHIPPED | OUTPATIENT
Start: 2021-06-23 | End: 2021-07-03

## 2021-06-23 RX ORDER — HYDRALAZINE HYDROCHLORIDE 20 MG/ML
20 INJECTION INTRAMUSCULAR; INTRAVENOUS ONCE
Status: COMPLETED | OUTPATIENT
Start: 2021-06-23 | End: 2021-06-23

## 2021-06-23 RX ORDER — HYDROCODONE BITARTRATE AND ACETAMINOPHEN 5; 325 MG/1; MG/1
1 TABLET ORAL EVERY 6 HOURS PRN
Qty: 12 TABLET | Refills: 0 | Status: SHIPPED | OUTPATIENT
Start: 2021-06-23 | End: 2021-06-26

## 2021-06-23 RX ORDER — KETOROLAC TROMETHAMINE 30 MG/ML
30 INJECTION, SOLUTION INTRAMUSCULAR; INTRAVENOUS ONCE
Status: COMPLETED | OUTPATIENT
Start: 2021-06-23 | End: 2021-06-23

## 2021-06-23 RX ORDER — TAMSULOSIN HYDROCHLORIDE 0.4 MG/1
0.4 CAPSULE ORAL ONCE
Status: COMPLETED | OUTPATIENT
Start: 2021-06-23 | End: 2021-06-23

## 2021-06-23 RX ORDER — TAMSULOSIN HYDROCHLORIDE 0.4 MG/1
0.4 CAPSULE ORAL DAILY
Qty: 30 CAPSULE | Refills: 0 | Status: SHIPPED | OUTPATIENT
Start: 2021-06-23

## 2021-06-23 RX ORDER — ENALAPRILAT 2.5 MG/2ML
1.25 INJECTION INTRAVENOUS ONCE
Status: COMPLETED | OUTPATIENT
Start: 2021-06-23 | End: 2021-06-23

## 2021-06-23 RX ORDER — ONDANSETRON 2 MG/ML
4 INJECTION INTRAMUSCULAR; INTRAVENOUS ONCE
Status: COMPLETED | OUTPATIENT
Start: 2021-06-23 | End: 2021-06-23

## 2021-06-23 RX ORDER — 0.9 % SODIUM CHLORIDE 0.9 %
500 INTRAVENOUS SOLUTION INTRAVENOUS ONCE
Status: COMPLETED | OUTPATIENT
Start: 2021-06-23 | End: 2021-06-23

## 2021-06-23 RX ORDER — LISINOPRIL 10 MG/1
10 TABLET ORAL DAILY
Qty: 30 TABLET | Refills: 0 | Status: SHIPPED | OUTPATIENT
Start: 2021-06-23 | End: 2021-07-23

## 2021-06-23 RX ADMIN — ENALAPRILAT 1.25 MG: 2.5 INJECTION INTRAVENOUS at 06:02

## 2021-06-23 RX ADMIN — MORPHINE SULFATE 4 MG: 4 INJECTION, SOLUTION INTRAMUSCULAR; INTRAVENOUS at 02:37

## 2021-06-23 RX ADMIN — ONDANSETRON 4 MG: 2 INJECTION INTRAMUSCULAR; INTRAVENOUS at 02:38

## 2021-06-23 RX ADMIN — METOPROLOL TARTRATE 5 MG: 5 INJECTION INTRAVENOUS at 05:03

## 2021-06-23 RX ADMIN — TAMSULOSIN HYDROCHLORIDE 0.4 MG: 0.4 CAPSULE ORAL at 05:04

## 2021-06-23 RX ADMIN — SODIUM CHLORIDE 500 ML: 9 INJECTION, SOLUTION INTRAVENOUS at 02:38

## 2021-06-23 RX ADMIN — IOPAMIDOL 80 ML: 755 INJECTION, SOLUTION INTRAVENOUS at 03:16

## 2021-06-23 RX ADMIN — HYDRALAZINE HYDROCHLORIDE 20 MG: 20 INJECTION, SOLUTION INTRAMUSCULAR; INTRAVENOUS at 05:03

## 2021-06-23 RX ADMIN — KETOROLAC TROMETHAMINE 30 MG: 30 INJECTION, SOLUTION INTRAMUSCULAR; INTRAVENOUS at 03:48

## 2021-06-23 ASSESSMENT — PAIN SCALES - GENERAL
PAINLEVEL_OUTOF10: 10

## 2021-06-23 ASSESSMENT — PAIN DESCRIPTION - FREQUENCY: FREQUENCY: CONTINUOUS

## 2021-06-23 ASSESSMENT — PAIN DESCRIPTION - ORIENTATION: ORIENTATION: RIGHT;LEFT;LOWER

## 2021-06-23 ASSESSMENT — PAIN DESCRIPTION - PAIN TYPE: TYPE: ACUTE PAIN

## 2021-06-23 ASSESSMENT — PAIN DESCRIPTION - DESCRIPTORS: DESCRIPTORS: ACHING

## 2021-06-23 ASSESSMENT — PAIN DESCRIPTION - LOCATION: LOCATION: ABDOMEN

## 2021-06-23 NOTE — ED NOTES
Pt. Resting in bed with even and unlabored respirations. Pt. States pain is a 10/10 at this time. Unable to obtain accurate blood pressure at this time. Pt unable to lay still. Pt medicated per mar. Pt. Updated about plan of care and treatment. Pt. Has no further concerns, questions or needs at this time. Call light within reach.         Raymundo Stout RN  06/23/21 5215 Pineda Oconnor RN  06/23/21 5770

## 2021-06-23 NOTE — ED NOTES
Pt updated about plan for medication to decrease pt blood pressure prior to discharge. Pt. Resting in bed with even and unlabored respirations. Pt. Updated about plan of care and treatment. Pt. Has no further concerns, questions or needs at this time. Call light within reach.         Raymundo Stout RN  06/23/21 0091

## 2021-06-23 NOTE — ED PROVIDER NOTES
[facility]  eMERGENCY dEPARTMENT eNCOUnter          CHIEF COMPLAINT       Chief Complaint   Patient presents with    Abdominal Pain       Nurses Notes reviewed and I agree except as noted in the HPI. HISTORY OF PRESENT ILLNESS    Maki Navarrete is a 52 y.o. male who presents with severe lower abdominal pain onset shortly after he got done eating pasta salad. Patient states that he feels like he has to go to the bathroom but he cannot. He has not had any syncopal episodes. He has had nausea and vomiting. He is sweating profusely at bedside. Rates the pain a 10 out of 10. Does not radiate or refer. Does not go through to the back. Patient has no abdominal history that he knows of. Is never had a kidney stone. He was feeling fine prior to the onset of this problem. Patient takes lisinopril for hypertension. Patient is otherwise healthy in his opinion. Currently the patient is in moderate amount of pain no apparent distress. REVIEW OF SYSTEMS     Review of Systems   Constitutional: Negative for activity change, appetite change, diaphoresis, fatigue and unexpected weight change. HENT: Negative for congestion, ear discharge, ear pain, hearing loss, rhinorrhea, sinus pressure, sore throat, trouble swallowing and voice change. Eyes: Negative for photophobia, pain, discharge, redness and itching. Respiratory: Negative for cough, choking, chest tightness, shortness of breath and wheezing. Cardiovascular: Negative for chest pain, palpitations and leg swelling. Gastrointestinal: Positive for abdominal pain. Negative for abdominal distention, blood in stool, constipation, diarrhea, nausea and vomiting. Endocrine: Negative for polydipsia, polyphagia and polyuria. Genitourinary: Negative for decreased urine volume, difficulty urinating, dysuria, enuresis, frequency, hematuria and urgency.    Musculoskeletal: Negative for arthralgias, back pain, gait problem, myalgias, neck pain and neck stiffness. Skin: Negative for pallor and rash. Allergic/Immunologic: Negative for immunocompromised state. Neurological: Negative for dizziness, tremors, seizures, syncope, facial asymmetry, weakness, light-headedness, numbness and headaches. Hematological: Negative for adenopathy. Does not bruise/bleed easily. Psychiatric/Behavioral: Negative for agitation, hallucinations and suicidal ideas. The patient is not nervous/anxious. PAST MEDICAL HISTORY    has a past medical history of Hernia. SURGICAL HISTORY      has a past surgical history that includes Inguinal hernia repair (Left, 2013). CURRENT MEDICATIONS       Discharge Medication List as of 2021  5:00 AM      CONTINUE these medications which have NOT CHANGED    Details   Multiple Vitamins-Minerals (MULTIVITAMIN ADULT PO) Take 1 tablet by mouth dailyHistorical Med             ALLERGIES     has No Known Allergies. FAMILY HISTORY     He indicated that his mother is . He indicated that his father is alive. family history includes COPD in his mother; Heart Attack in his father; Heart Disease in his father; High Blood Pressure in his mother; Obesity in his mother. SOCIAL HISTORY      reports that he has been smoking cigarettes. He has a 10.00 pack-year smoking history. He has never used smokeless tobacco. He reports current alcohol use. He reports current drug use. Drug: Marijuana. PHYSICAL EXAM     INITIAL VITALS:  height is 6' (1.829 m) and weight is 300 lb (136.1 kg). His oral temperature is 97.8 °F (36.6 °C). His blood pressure is 175/97 (abnormal) and his pulse is 77. His respiration is 15 and oxygen saturation is 99%. Physical Exam  Vitals and nursing note reviewed. Constitutional:       General: He is not in acute distress. Appearance: He is well-developed. He is not diaphoretic. HENT:      Head: Normocephalic and atraumatic.       Right Ear: External ear normal.      Left Ear: External ear normal.      Nose: Nose normal.   Eyes:      General: Lids are normal. No scleral icterus. Right eye: No discharge. Left eye: No discharge. Conjunctiva/sclera: Conjunctivae normal.      Right eye: No exudate. Left eye: No exudate. Pupils: Pupils are equal, round, and reactive to light. Neck:      Thyroid: No thyromegaly. Vascular: No JVD. Trachea: No tracheal deviation. Cardiovascular:      Rate and Rhythm: Normal rate and regular rhythm. Pulses: Normal pulses. Heart sounds: Normal heart sounds, S1 normal and S2 normal. No murmur heard. No friction rub. No gallop. Pulmonary:      Effort: Pulmonary effort is normal. No respiratory distress. Breath sounds: Normal breath sounds. No stridor. No wheezing or rales. Chest:      Chest wall: No tenderness. Abdominal:      General: Bowel sounds are normal. There is no distension. Palpations: Abdomen is soft. There is no mass. Tenderness: There is abdominal tenderness in the right lower quadrant and suprapubic area. There is no guarding or rebound. Negative signs include Murdock's sign, Rovsing's sign, McBurney's sign, psoas sign and obturator sign. Hernia: No hernia is present. Musculoskeletal:         General: No tenderness. Normal range of motion. Cervical back: Normal range of motion and neck supple. Normal range of motion. Lymphadenopathy:      Cervical: No cervical adenopathy. Skin:     General: Skin is warm and dry. Findings: No bruising, ecchymosis, lesion or rash. Neurological:      Mental Status: He is alert and oriented to person, place, and time. Cranial Nerves: No cranial nerve deficit. Sensory: No sensory deficit. Coordination: Coordination normal.      Deep Tendon Reflexes: Reflexes are normal and symmetric. Psychiatric:         Speech: Speech normal.         Behavior: Behavior normal.         Thought Content:  Thought content normal. Judgment: Judgment normal.           DIFFERENTIAL DIAGNOSIS:   Constipation bowel obstruction urinary tract infection kidney stone medication noncompliance    DIAGNOSTIC RESULTS     EKG: All EKG's are interpreted by the Emergency Department Physician who either signs or Co-signs this chart in the absence of a cardiologist.  EKG revealed normal sinus rhythm, normal axis, ventricular rate of 71 NC interval 166 QRS duration 104 QT interval 444 QTC of 482. RADIOLOGY: non-plain film images(s) such as CT, Ultrasound and MRI are read by the radiologist.  CT ABDOMEN PELVIS W IV CONTRAST Additional Contrast? None   Final Result   Impression:      2 mm right UVJ stone with right ureteral dilation. .      This document has been electronically signed by: Sammi Eddy MD on    06/23/2021 04:21 AM      All CTs at this facility use dose modulation techniques and iterative    reconstructions, and/or weight-based dosing   when appropriate to reduce radiation to a low as reasonably achievable.             LABS:   Labs Reviewed   CBC WITH AUTO DIFFERENTIAL - Abnormal; Notable for the following components:       Result Value    WBC 10.9 (*)     All other components within normal limits   BASIC METABOLIC PANEL - Abnormal; Notable for the following components:    Glucose 131 (*)     All other components within normal limits   URINE WITH REFLEXED MICRO - Abnormal; Notable for the following components:    Blood, Urine LARGE (*)     Protein, UA 30 (*)     Character, Urine CLOUDY (*)     All other components within normal limits   BRAIN NATRIURETIC PEPTIDE   HEPATIC FUNCTION PANEL   LIPASE   TROPONIN   MAGNESIUM   ETHANOL   URINE DRUG SCREEN   ANION GAP   GLOMERULAR FILTRATION RATE, ESTIMATED   OSMOLALITY       EMERGENCY DEPARTMENT COURSE:   Vitals:    Vitals:    06/23/21 0520 06/23/21 0532 06/23/21 0601 06/23/21 0620   BP: (!) 197/120 (!) 182/118 (!) 172/102 (!) 175/97   Pulse: 84 73 84 77   Resp: 18 16 18 15   Temp:       TempSrc: SpO2: 99% 100% 99%    Weight:       Height:         Patient was assessed at bedside appropriate labs and imaging were ordered. This is very reminiscent of a kidney stone. Patient is also medically noncompliant not taking his blood pressure medication. States he has not been taking it for almost 1/2-year. Here today the patient was given blood pressure medication pain medication. I reviewed all labs and imaging and went back to bedside to reassess the patient. It appears the kidney stone has passed into his bladder. Patient's pain had almost completely resolved. At this point I feel the patient needs to follow-up with urology. The stone clinic appointment is scheduled for him. Patient was given multiple doses of blood pressure medication. At this point I felt he may need to be admitted for hypertension however the patient declined admission and did not want to stay. At this point he is not symptomatic in other words he has no dizziness or headache indicating that he is probably been hypertensive for quite some time. He is neurologically intact. I encouraged him to take his blood pressure medication otherwise we may see him again and he may have had a stroke. Patient understands the gravity of the situation and states that he will take the medication. Patient's blood pressure did come down to 175/97. At this point the patient be safely discharged home he has appropriate follow-up. He is appropriate refills for his medications. Patient is subsequently discharged home in stable condition. Patient has what appears to be a kidney stone. This appears to be right at the UVJ junction almost in the bladder. Patient has been given pain medication anti-inflammatories and Flomax he is instructed to take it as prescribed. He is instructed to not eat until cleared by urology he has a scheduled appointment today 6/23/2021 at 1:30 PM.  She also has what appears to be uncontrolled hypertension.   He is instructed to take medications as prescribed. He is instructed to follow-up with his primary care physician and do so within the next 1 to 2 days. Patient is instructed to take medications with sips of water. Patient is instructed return to the nearest emergency room immediately for any new or worsening complaints. CRITICAL CARE:   None     CONSULTS:  None    PROCEDURES:  None    FINAL IMPRESSION      1. Kidney stone    2. Uncontrolled hypertension    3. Essential hypertension    4. Wellness examination          DISPOSITION/PLAN   Discharge    PATIENT REFERRED TO:  [address]  [address] [facility]  [address] [facility]  Go today  Scheduled appointment at 1:30 PM for stone clinic    [facility]  [address] [facility]  Call in 1 day        DISCHARGE MEDICATIONS:  Discharge Medication List as of 6/23/2021  5:00 AM      START taking these medications    Details   HYDROcodone-acetaminophen (NORCO) 5-325 MG per tablet Take 1 tablet by mouth every 6 hours as needed for Pain for up to 3 days. Intended supply: 3 days.  Take lowest dose possible to manage pain, Disp-12 tablet, R-0Print      tamsulosin (FLOMAX) 0.4 MG capsule Take 1 capsule by mouth daily, Disp-30 capsule, R-0Print             (Please note that portions of this note were completed with a voice recognition program.  Efforts were made to edit the dictations but occasionally words are mis-transcribed.)    Gilberto Hammond., DO  06/24/21 0014

## 2021-06-24 ENCOUNTER — OFFICE VISIT (OUTPATIENT)
Dept: UROLOGY | Age: 51
End: 2021-06-24

## 2021-06-24 VITALS — BODY MASS INDEX: 40.63 KG/M2 | HEIGHT: 72 IN | WEIGHT: 300 LBS | RESPIRATION RATE: 16 BRPM

## 2021-06-24 DIAGNOSIS — N20.0 KIDNEY STONE: Primary | ICD-10-CM

## 2021-06-24 LAB
BILIRUBIN URINE: ABNORMAL
BLOOD URINE, POC: ABNORMAL
CHARACTER, URINE: CLEAR
COLOR, URINE: YELLOW
EKG ATRIAL RATE: 71 BPM
EKG P AXIS: 62 DEGREES
EKG P-R INTERVAL: 166 MS
EKG Q-T INTERVAL: 444 MS
EKG QRS DURATION: 104 MS
EKG QTC CALCULATION (BAZETT): 482 MS
EKG R AXIS: 13 DEGREES
EKG T AXIS: 86 DEGREES
EKG VENTRICULAR RATE: 71 BPM
GLUCOSE URINE: NEGATIVE MG/DL
KETONES, URINE: NEGATIVE
LEUKOCYTE CLUMPS, URINE: NEGATIVE
NITRITE, URINE: NEGATIVE
PH, URINE: 5.5 (ref 5–9)
PROTEIN, URINE: 100 MG/DL
SPECIFIC GRAVITY, URINE: >= 1.03 (ref 1–1.03)
UROBILINOGEN, URINE: 0.2 EU/DL (ref 0–1)

## 2021-06-24 PROCEDURE — 93010 ELECTROCARDIOGRAM REPORT: CPT | Performed by: INTERNAL MEDICINE

## 2021-06-24 PROCEDURE — 99204 OFFICE O/P NEW MOD 45 MIN: CPT | Performed by: NURSE PRACTITIONER

## 2021-06-24 PROCEDURE — 81003 URINALYSIS AUTO W/O SCOPE: CPT | Performed by: NURSE PRACTITIONER

## 2021-06-24 ASSESSMENT — ENCOUNTER SYMPTOMS
WHEEZING: 0
SORE THROAT: 0
SHORTNESS OF BREATH: 0
EYE ITCHING: 0
DIARRHEA: 0
EYE PAIN: 0
ABDOMINAL PAIN: 1
EYE DISCHARGE: 0
EYE REDNESS: 0
VOICE CHANGE: 0
VOMITING: 0
RHINORRHEA: 0
CONSTIPATION: 0
NAUSEA: 0
BLOOD IN STOOL: 0
BACK PAIN: 0
CHEST TIGHTNESS: 0
SINUS PRESSURE: 0
ABDOMINAL DISTENTION: 0
TROUBLE SWALLOWING: 0
COUGH: 0
CHOKING: 0
PHOTOPHOBIA: 0

## 2021-06-24 NOTE — PATIENT INSTRUCTIONS
Patient Education        Stopping Smoking: Care Instructions  Your Care Instructions     Cigarette smokers crave the nicotine in cigarettes. Giving it up is much harder than simply changing a habit. Your body has to stop craving the nicotine. It is hard to quit, but you can do it. There are many tools that people use to quit smoking. You may find that combining tools works best for you. There are several steps to quitting. First you get ready to quit. Then you get support to help you. After that, you learn new skills and behaviors to become a nonsmoker. For many people, a necessary step is getting and using medicine. Your doctor will help you set up the plan that best meets your needs. You may want to attend a smoking cessation program to help you quit smoking. When you choose a program, look for one that has proven success. Ask your doctor for ideas. You will greatly increase your chances of success if you take medicine as well as get counseling or join a cessation program.  Some of the changes you feel when you first quit tobacco are uncomfortable. Your body will miss the nicotine at first, and you may feel short-tempered and grumpy. You may have trouble sleeping or concentrating. Medicine can help you deal with these symptoms. You may struggle with changing your smoking habits and rituals. The last step is the tricky one: Be prepared for the smoking urge to continue for a time. This is a lot to deal with, but keep at it. You will feel better. Follow-up care is a key part of your treatment and safety. Be sure to make and go to all appointments, and call your doctor if you are having problems. It's also a good idea to know your test results and keep a list of the medicines you take. How can you care for yourself at home? · Ask your family, friends, and coworkers for support. You have a better chance of quitting if you have help and support.   · Join a support group, such as Nicotine Anonymous, for people who are trying to quit smoking. · Consider signing up for a smoking cessation program, such as the American Lung Association's Freedom from Smoking program.  · Get text messaging support. Go to the website at www.smokefree. gov to sign up for the [facility] program.  · Set a quit date. Pick your date carefully so that it is not right in the middle of a big deadline or stressful time. Once you quit, do not even take a puff. Get rid of all ashtrays and lighters after your last cigarette. Clean your house and your clothes so that they do not smell of smoke. · Learn how to be a nonsmoker. Think about ways you can avoid those things that make you reach for a cigarette. ? Avoid situations that put you at greatest risk for smoking. For some people, it is hard to have a drink with friends without smoking. For others, they might skip a coffee break with coworkers who smoke. ? Change your daily routine. Take a different route to work or eat a meal in a different place. · Cut down on stress. Calm yourself or release tension by doing an activity you enjoy, such as reading a book, taking a hot bath, or gardening. · Talk to your doctor or pharmacist about nicotine replacement therapy, which replaces the nicotine in your body. You still get nicotine but you do not use tobacco. Nicotine replacement products help you slowly reduce the amount of nicotine you need. These products come in several forms, many of them available over-the-counter:  ? Nicotine patches  ? Nicotine gum and lozenges  ? Nicotine inhaler  · Ask your doctor about bupropion (Wellbutrin) or varenicline (Chantix), which are prescription medicines. They do not contain nicotine. They help you by reducing withdrawal symptoms, such as stress and anxiety. · Some people find hypnosis, acupuncture, and massage helpful for ending the smoking habit. · Eat a healthy diet and get regular exercise.  Having healthy habits will help your body move past its craving for nicotine. · Be prepared to keep trying. Most people are not successful the first few times they try to quit. Do not get mad at yourself if you smoke again. Make a list of things you learned and think about when you want to try again, such as next week, next month, or next year. Where can you learn more? Go to https://[employer]. org and sign in to your "[employer] account. Enter E426 in the [employer] box to learn more about \"Stopping Smoking: Care Instructions. \"     If you do not have an account, please click on the \"Sign Up Now\" link. Current as of: February 11, 2021               Content Version: 12.9  © 2006-2021 Healthwise, [employer]. Care instructions adapted under license by [facility] ([facility]). If you have questions about a medical condition or this instruction, always ask your healthcare professional. [address] any warranty or liability for your use of this information. KIDNEY STONE PREVENTION  After you have a kidney stone attack, you should have blood and urine tests to determine whether you have certain health problems or dietary issues that increase the risk of kidney stones (table 1). If you passed and saved the stone, it should be analyzed to determine the type of stone. In addition, your clinician may request that you perform a 24-hour urine collection (all the urine you make over a 24-hour period) to determine underlying risk factors for your kidney stone disease. (See \"Patient education: Collection of a 24-hour urine specimen (Beyond the Basics)\". )  Based upon these test results, one or more of the following may be recommended [1]:  ?You may be advised to drink more fluids to decrease the risk of another stone. The goal is to increase the amount of urine that flows through your kidneys and also to lower the concentrations of substances that promote stone formation.  Experts recommend drinking enough fluid that you make more than 2 liters of urine per day. ? You may be advised to make changes in your diet; the changes recommended will depend upon the type of kidney stone you have and the 24-hour urine results. ?You may be advised to take a medication to reduce the risk of future stones.

## 2021-06-24 NOTE — PROGRESS NOTES
Inocente Werner is a 52 y.o. male is a new patient who was referred here by [facility] ED. Inocente Werner was seen in the ED on 6- due to abdominal pain, feelings of constipation, nausea and vomiting. The pain started Tuesday evening. There are no aggravating factors. There is also associated nausea & vomiting. CT abd/pelvis showed 2 mm right UVJ stone with right ureteral dilation. Their pain was controlled so they were discharged with pain medication and Flomax. They deny dysuria, hematuria, fever, or chills. He denies hx of stones. He denies feeling or seeing stone pass. Feeling well today. No pain medications since yesterday. Around 430 pm.    CRT 0.9, WBC 10.9  UA with >200 RBC      Past Medical History:   Diagnosis Date    Hernia 2009    inguinal hernia       Past Surgical History:   Procedure Laterality Date    INGUINAL HERNIA REPAIR Left 09/16/2013    Dr. Auysh Sosa        Current Outpatient Medications on File Prior to Visit   Medication Sig Dispense Refill    ibuprofen (IBU) 600 MG tablet Take 1 tablet by mouth every 6 hours as needed for Pain 40 tablet 0    HYDROcodone-acetaminophen (NORCO) 5-325 MG per tablet Take 1 tablet by mouth every 6 hours as needed for Pain for up to 3 days. Intended supply: 3 days. Take lowest dose possible to manage pain 12 tablet 0    tamsulosin (FLOMAX) 0.4 MG capsule Take 1 capsule by mouth daily 30 capsule 0    lisinopril (PRINIVIL;ZESTRIL) 10 MG tablet Take 1 tablet by mouth daily 30 tablet 0    Multiple Vitamins-Minerals (MULTIVITAMIN ADULT PO) Take 1 tablet by mouth daily       No current facility-administered medications on file prior to visit.        No Known Allergies    Social History     Tobacco Use    Smoking status: Current Every Day Smoker     Packs/day: 0.50     Years: 20.00     Pack years: 10.00     Types: Cigarettes    Smokeless tobacco: Never Used   Substance Use Topics    Alcohol use: Yes     Comment: occasional     Drug use: Yes     Types: normal. No rashes or lesions. Psychiatric:        Normal mood and affect. Labs   WBC:    Lab Results   Component Value Date    WBC 10.9 06/23/2021     Hemoglobin/Hematocrit:    Lab Results   Component Value Date    HGB 15.4 06/23/2021    HCT 44.5 06/23/2021     BMP:    Lab Results   Component Value Date     06/23/2021    K 3.5 06/23/2021     06/23/2021    CO2 23 06/23/2021    BUN 12 06/23/2021    LABALBU 4.8 06/23/2021    CREATININE 0.9 06/23/2021    CALCIUM 9.3 06/23/2021    LABGLOM 90 06/23/2021     PT/INR:  No results found for: PROTIME, INR  PTT:  No results found for: APTT[APTT  Urinalysis:   Lab Results   Component Value Date    BLOODU Small 06/24/2021    BLOODU LARGE 06/23/2021    NITRU Negative 06/24/2021    LEUKOCYTESUR NEGATIVE 06/23/2021    COLORU Yellow 06/24/2021    COLORU YELLOW 06/23/2021    WBCUA 0-2 06/23/2021    BACTERIA NONE SEEN 06/23/2021     Urine Culture:  No results found for: LABURIN  Blood Culture:  No results found for: [facility]      Radiology  The patient has had a CT  With Contrast which I have reviewed along with its accompanying report. The study demonstrates :     Impression:       2 mm right UVJ stone with right ureteral dilation. .                 Assessment    Abdominal pain  2 mm right UVJ stone  Nausea/vomiting      Plan    Discussed stone management. Would like to proceed with trial of passage. Has flomax. Increase fluids  He was advised to go to ER for fever, chills or uncontrolled pain  FU in 3 weeks with Renal US and KUB prior. Kidney stone prevention handout given to patient.      Misty Files, APRN - CNP, CNP  6/24/2021 8:33 AM

## 2021-06-25 ENCOUNTER — TELEPHONE (OUTPATIENT)
Dept: UROLOGY | Age: 51
End: 2021-06-25

## 2021-06-25 NOTE — TELEPHONE ENCOUNTER
Patient scheduled for Renal US  at [address] [place] [address]  on 07- at 8:00am with an arrival of 7:45am.  Patient needs to avoid carbonated beverages and be well hydrated. Patient notified and voiced understanding.

## 2021-12-10 ENCOUNTER — HOSPITAL ENCOUNTER (EMERGENCY)
Age: 51
Discharge: HOME | End: 2021-12-10

## 2021-12-10 VITALS
DIASTOLIC BLOOD PRESSURE: 130 MMHG | HEART RATE: 79 BPM | WEIGHT: 285 LBS | SYSTOLIC BLOOD PRESSURE: 228 MMHG | HEIGHT: 72 IN | RESPIRATION RATE: 18 BRPM | OXYGEN SATURATION: 97 % | BODY MASS INDEX: 38.6 KG/M2 | TEMPERATURE: 97.3 F

## 2021-12-10 DIAGNOSIS — K08.89 PAIN, DENTAL: Primary | ICD-10-CM

## 2021-12-10 DIAGNOSIS — R03.0 ELEVATED BLOOD PRESSURE READING: ICD-10-CM

## 2021-12-10 PROCEDURE — 99213 OFFICE O/P EST LOW 20 MIN: CPT

## 2021-12-10 PROCEDURE — 99214 OFFICE O/P EST MOD 30 MIN: CPT | Performed by: NURSE PRACTITIONER

## 2021-12-10 RX ORDER — PENICILLIN V POTASSIUM 500 MG/1
500 TABLET ORAL 4 TIMES DAILY
Qty: 28 TABLET | Refills: 0 | Status: SHIPPED | OUTPATIENT
Start: 2021-12-10 | End: 2021-12-17

## 2021-12-10 ASSESSMENT — PAIN DESCRIPTION - FREQUENCY: FREQUENCY: CONTINUOUS

## 2021-12-10 ASSESSMENT — ENCOUNTER SYMPTOMS
SHORTNESS OF BREATH: 0
COUGH: 0

## 2021-12-10 ASSESSMENT — PAIN DESCRIPTION - DESCRIPTORS: DESCRIPTORS: ACHING;CONSTANT

## 2021-12-10 ASSESSMENT — PAIN DESCRIPTION - ORIENTATION: ORIENTATION: LOWER;LEFT

## 2021-12-10 ASSESSMENT — PAIN SCALES - GENERAL: PAINLEVEL_OUTOF10: 10

## 2021-12-10 ASSESSMENT — PAIN DESCRIPTION - PAIN TYPE: TYPE: ACUTE PAIN

## 2021-12-10 NOTE — ED PROVIDER NOTES
[address] Kaur       Chief Complaint   Patient presents with    Dental Pain     left lower       Nurses Notes reviewed and I agree except as noted in the HPI. HISTORY OF PRESENT ILLNESS   Samia Bose is a 54 y.o. male who presents for evaluation of dental pain on the left lower side. [facility] November 2021 for antibiotics  Missed extraction date   Pain started this morning. The patient/patient representative has no other acute complaints at this time. REVIEW OF SYSTEMS     Review of Systems   Constitutional: Negative for chills, fatigue and fever. HENT: Positive for dental problem. Respiratory: Negative for cough and shortness of breath. Cardiovascular: Negative for chest pain and palpitations. Skin: Negative for rash. Allergic/Immunologic: Negative for environmental allergies and food allergies. Neurological: Negative for headaches. PAST MEDICAL HISTORY         Diagnosis Date    Hernia 2009    inguinal hernia       SURGICAL HISTORY     Patient  has a past surgical history that includes Inguinal hernia repair (Left, 09/16/2013). CURRENT MEDICATIONS       Previous Medications    IBUPROFEN (IBU) 600 MG TABLET    Take 1 tablet by mouth every 6 hours as needed for Pain    LISINOPRIL (PRINIVIL;ZESTRIL) 10 MG TABLET    Take 1 tablet by mouth daily    MULTIPLE VITAMINS-MINERALS (MULTIVITAMIN ADULT PO)    Take 1 tablet by mouth daily    TAMSULOSIN (FLOMAX) 0.4 MG CAPSULE    Take 1 capsule by mouth daily       ALLERGIES     Patient is has No Known Allergies. FAMILY HISTORY     Patient'sfamily history includes COPD in his mother; Heart Attack in his father; Heart Disease in his father; High Blood Pressure in his mother; Obesity in his mother. SOCIAL HISTORY     Patient  reports that he has been smoking cigarettes. He has a 10.00 pack-year smoking history.  He has never used smokeless tobacco. He reports current alcohol use. He reports current drug use. Drug: Marijuana Polina Coil). PHYSICAL EXAM     ED TRIAGE VITALS  BP: (!) 228/130 (manual ), Temp: 97.3 °F (36.3 °C), Pulse: 79, Resp: 18, SpO2: 97 %  Physical Exam  Vitals and nursing note reviewed. Constitutional:       General: He is not in acute distress. Appearance: Normal appearance. He is well-developed and well-groomed. HENT:      Head: Normocephalic and atraumatic. Right Ear: External ear normal.      Left Ear: External ear normal.      Mouth/Throat:      Lips: Pink. Mouth: Mucous membranes are moist.      Dentition: Abnormal dentition. Dental tenderness and gingival swelling present. Pharynx: Oropharynx is clear. Eyes:      Conjunctiva/sclera:      Right eye: Right conjunctiva is not injected. Left eye: Left conjunctiva is not injected. Pupils: Pupils are equal.   Cardiovascular:      Rate and Rhythm: Normal rate. Heart sounds: Normal heart sounds. Pulmonary:      Effort: Pulmonary effort is normal. No respiratory distress. Breath sounds: Normal breath sounds and air entry. Musculoskeletal:      Cervical back: Normal range of motion. Skin:     General: Skin is warm and dry. Findings: No rash (on exposed surfaces). Neurological:      Mental Status: He is alert and oriented to person, place, and time. Psychiatric:         Mood and Affect: Mood normal.         Speech: Speech normal.         Behavior: Behavior is cooperative. DIAGNOSTIC RESULTS   Labs:  Abnormal Labs Reviewed - No data to display     IMAGING:  No orders to display     URGENT CARE COURSE:     Vitals:    12/10/21 1624 12/10/21 1634   BP: (!) 245/119 (!) 228/130   Pulse: 79    Resp: 18    Temp: 97.3 °F (36.3 °C)    TempSrc: Tympanic    SpO2: 97%    Weight: 285 lb (129.3 kg)    Height: 6' (1.829 m)        Medications - No data to display  PROCEDURES:  FINALIMPRESSION      1. Pain, dental    2.  Elevated blood pressure reading DISPOSITION/PLAN   DISPOSITION [place] 12/10/2021 04:42:55 PM  Hypertension noted -advised patient that this blood pressure reading is dangerous and he is at high risk for but not limited to stroke or sudden cardiac death. Kiara DESHPANDE chaperone for conversation. Patient states that he knows he has high blood pressure and does not want to go to the emergency room. He states though he will monitor his blood pressure at home. He will be treated for suspected dental infection with penicillin. Follow with dentist as soon as possible. Physical assessment findings, diagnostic testing(s) if applicable, and vital signs reviewed with patient/patient representative. Differential diagnosis(s) discussed with patient/patient representative. Prescription medications and/or over-the-counter medications for symptom management discussed. Patient is to follow-up with family care provider in 2-3 days if no improvement. Patient is to go to the emergency department if symptoms change/worsen. Patient/patient representative is aware of care plan, questions answered, verbalizes understanding and is in agreement. Printed instructions attached to after visit summary. Problem List Items Addressed This Visit     None      Visit Diagnoses     Pain, dental    -  Primary    Relevant Medications    penicillin v potassium (VEETID) 500 MG tablet    Elevated blood pressure reading              PATIENT REFERRED TO:  [address] [place] [address],[address] [address] [facility] [address] [facility]  Schedule an appointment as soon as possible for a visit in 3 days  if you do not have a family provider, If symptoms change/worsen, go to the [address] [facility], APRN - CNP    Please note that some or all of this chart was generated using [employer] voice recognition software.  Although every effort was made to ensure the accuracy of this automated transcription, some errors in transcription may

## 2024-12-03 ENCOUNTER — OFFICE VISIT (OUTPATIENT)
Dept: SURGERY | Age: 54
End: 2024-12-03

## 2024-12-03 VITALS
OXYGEN SATURATION: 98 % | HEIGHT: 72 IN | SYSTOLIC BLOOD PRESSURE: 140 MMHG | DIASTOLIC BLOOD PRESSURE: 84 MMHG | RESPIRATION RATE: 18 BRPM | TEMPERATURE: 97.2 F | BODY MASS INDEX: 38.33 KG/M2 | HEART RATE: 106 BPM | WEIGHT: 283 LBS

## 2024-12-03 DIAGNOSIS — I10 ESSENTIAL HYPERTENSION: ICD-10-CM

## 2024-12-03 DIAGNOSIS — Z01.818 PRE-OP TESTING: ICD-10-CM

## 2024-12-03 DIAGNOSIS — K40.90 NON-RECURRENT UNILATERAL INGUINAL HERNIA WITHOUT OBSTRUCTION OR GANGRENE: Primary | ICD-10-CM

## 2024-12-03 PROCEDURE — 99204 OFFICE O/P NEW MOD 45 MIN: CPT | Performed by: SURGERY

## 2024-12-03 PROCEDURE — 3077F SYST BP >= 140 MM HG: CPT | Performed by: SURGERY

## 2024-12-03 PROCEDURE — 3079F DIAST BP 80-89 MM HG: CPT | Performed by: SURGERY

## 2024-12-03 RX ORDER — LISINOPRIL AND HYDROCHLOROTHIAZIDE 10; 12.5 MG/1; MG/1
1 TABLET ORAL DAILY
COMMUNITY

## 2024-12-03 ASSESSMENT — ENCOUNTER SYMPTOMS: ABDOMINAL PAIN: 1

## 2024-12-03 NOTE — PROGRESS NOTES
Alisha Hearn MD  General Surgery Clinic H&P    Pt Name: Nicolas Lara  MRN: [id number]  YOB: 1970  Date of evaluation: 12/03/2024    Primary Care Physician: No primary care provider on file.  Patient evaluated at the request of  self   Reason for evaluation: right inguinal hernia  IMPRESSIONS:   No diagnosis found.  does not have any pertinent problems on file.    PLANS:   Patient with large inguinal scrotal hernia containing loops of bowel in his right hemiscrotum.  I discussed with him due to the size a feel that an open repair would be in his best interest.  I discussed him the procedure how was performed the risk the benefits alternatives risk include but not limited to bleeding infection demonstrating structures nerve entrapment mesh infection need for more procedures were all discussed.  Patient understands the importance of no heavy lifting or strenuous activity after surgery to minimize reoccurrence.  Due to the large site of his hernia he may benefit from a drain placed into his scrotal cavity.  He expressed an understanding.  All of his questions were answered and he like to proceed with open right inguinal hernia repair with mesh    SUBJECTIVE:   CC: Right inguinal hernia    History of Chief Complaint:    Nicolas is a 54 y.o.male who has a history of remote left inguinal hernia repair.  Patient states 2 years ago while at work someone fell and he grabbed him and he felt him initial pull and pop in his right groin.  Since then he is continue to have an enlarging hernia, he now presents with what he describes as a very large right inguinal hernia, he says he is able to partially reduce it.  It is making it hard for him to participate in regular activities.  Denies any change in bowel habits    Medical History  Past Medical History:   Diagnosis Date    Hernia 01/01/2009    inguinal hernia    Kidney stones        Past Surgical History  Past Surgical History:   Procedure Laterality Date    INGUINAL

## 2024-12-05 ENCOUNTER — PREP FOR PROCEDURE (OUTPATIENT)
Dept: SURGERY | Age: 54
End: 2024-12-05

## 2024-12-05 DIAGNOSIS — K40.90 INGUINAL HERNIA, RIGHT: ICD-10-CM

## 2024-12-18 ENCOUNTER — PREP FOR PROCEDURE (OUTPATIENT)
Dept: SURGERY | Age: 54
End: 2024-12-18

## 2024-12-18 RX ORDER — SODIUM CHLORIDE 9 MG/ML
INJECTION, SOLUTION INTRAVENOUS CONTINUOUS
Status: CANCELLED | OUTPATIENT
Start: 2024-12-18

## 2024-12-23 ENCOUNTER — HOSPITAL ENCOUNTER (OUTPATIENT)
Age: 54
Discharge: HOME | End: 2024-12-23
Payer: COMMERCIAL

## 2024-12-23 DIAGNOSIS — Z01.818 PRE-OP TESTING: ICD-10-CM

## 2024-12-23 DIAGNOSIS — K40.90 NON-RECURRENT UNILATERAL INGUINAL HERNIA WITHOUT OBSTRUCTION OR GANGRENE: ICD-10-CM

## 2024-12-23 DIAGNOSIS — I10 ESSENTIAL HYPERTENSION: ICD-10-CM

## 2024-12-23 LAB
ANION GAP SERPL CALC-SCNC: 12 MEQ/L (ref 8–16)
BUN SERPL-MCNC: 17 MG/DL (ref 7–22)
CALCIUM SERPL-MCNC: 9.3 MG/DL (ref 8.5–10.5)
CHLORIDE SERPL-SCNC: 99 MEQ/L (ref 98–111)
CO2 SERPL-SCNC: 27 MEQ/L (ref 23–33)
CREAT SERPL-MCNC: 1.2 MG/DL (ref 0.4–1.2)
EKG ATRIAL RATE: 74 BPM
EKG P AXIS: 61 DEGREES
EKG P-R INTERVAL: 160 MS
EKG Q-T INTERVAL: 436 MS
EKG QRS DURATION: 104 MS
EKG QTC CALCULATION (BAZETT): 483 MS
EKG R AXIS: 27 DEGREES
EKG T AXIS: 79 DEGREES
EKG VENTRICULAR RATE: 74 BPM
GFR SERPL CREATININE-BSD FRML MDRD: 73 ML/MIN/1.73M2
GLUCOSE SERPL-MCNC: 101 MG/DL (ref 70–108)
HCT VFR BLD AUTO: 44.8 % (ref 42–52)
HGB BLD-MCNC: 15.1 GM/DL (ref 14–18)
POTASSIUM SERPL-SCNC: 4.5 MEQ/L (ref 3.5–5.2)
SODIUM SERPL-SCNC: 138 MEQ/L (ref 135–145)

## 2024-12-23 PROCEDURE — 85014 HEMATOCRIT: CPT

## 2024-12-23 PROCEDURE — 85018 HEMOGLOBIN: CPT

## 2024-12-23 PROCEDURE — 36415 COLL VENOUS BLD VENIPUNCTURE: CPT

## 2024-12-23 PROCEDURE — 93005 ELECTROCARDIOGRAM TRACING: CPT

## 2024-12-23 PROCEDURE — 80048 BASIC METABOLIC PNL TOTAL CA: CPT

## 2024-12-23 PROCEDURE — 93010 ELECTROCARDIOGRAM REPORT: CPT | Performed by: INTERNAL MEDICINE

## 2025-01-03 ENCOUNTER — HOSPITAL ENCOUNTER (OUTPATIENT)
Age: 55
Setting detail: OUTPATIENT SURGERY
Discharge: HOME | End: 2025-01-03
Attending: SURGERY | Admitting: SURGERY
Payer: COMMERCIAL

## 2025-01-03 ENCOUNTER — ANESTHESIA (OUTPATIENT)
Dept: OPERATING ROOM | Age: 55
End: 2025-01-03
Payer: COMMERCIAL

## 2025-01-03 ENCOUNTER — ANESTHESIA EVENT (OUTPATIENT)
Dept: OPERATING ROOM | Age: 55
End: 2025-01-03
Payer: COMMERCIAL

## 2025-01-03 VITALS
DIASTOLIC BLOOD PRESSURE: 95 MMHG | HEIGHT: 72 IN | WEIGHT: 279.5 LBS | TEMPERATURE: 97 F | SYSTOLIC BLOOD PRESSURE: 145 MMHG | OXYGEN SATURATION: 92 % | HEART RATE: 64 BPM | BODY MASS INDEX: 37.86 KG/M2 | RESPIRATION RATE: 18 BRPM

## 2025-01-03 DIAGNOSIS — K40.90 INGUINAL HERNIA, RIGHT: Primary | ICD-10-CM

## 2025-01-03 LAB
AMPHETAMINES UR QL SCN: NEGATIVE
BARBITURATES UR QL SCN: NEGATIVE
BENZODIAZ UR QL SCN: NEGATIVE
BZE UR QL SCN: NORMAL
CANNABINOIDS UR QL SCN: NORMAL
FENTANYL: NEGATIVE
OPIATES UR QL SCN: NEGATIVE
OXYCODONE: NEGATIVE
PHENCYCLIDINE QUANTITATIVE URINE: NEGATIVE

## 2025-01-03 PROCEDURE — 2580000003 HC RX 258: Performed by: NURSE PRACTITIONER

## 2025-01-03 PROCEDURE — 6360000002 HC RX W HCPCS: Performed by: ANESTHESIOLOGY

## 2025-01-03 PROCEDURE — 6360000002 HC RX W HCPCS

## 2025-01-03 PROCEDURE — 7100000001 HC PACU RECOVERY - ADDTL 15 MIN: Performed by: SURGERY

## 2025-01-03 PROCEDURE — 2500000003 HC RX 250 WO HCPCS: Performed by: ANESTHESIOLOGY

## 2025-01-03 PROCEDURE — 2500000003 HC RX 250 WO HCPCS: Performed by: NURSE PRACTITIONER

## 2025-01-03 PROCEDURE — 2500000003 HC RX 250 WO HCPCS

## 2025-01-03 PROCEDURE — 7100000000 HC PACU RECOVERY - FIRST 15 MIN: Performed by: SURGERY

## 2025-01-03 PROCEDURE — 7100000011 HC PHASE II RECOVERY - ADDTL 15 MIN: Performed by: SURGERY

## 2025-01-03 PROCEDURE — 3600000012 HC SURGERY LEVEL 2 ADDTL 15MIN: Performed by: SURGERY

## 2025-01-03 PROCEDURE — 6360000002 HC RX W HCPCS: Performed by: NURSE PRACTITIONER

## 2025-01-03 PROCEDURE — C1781 MESH (IMPLANTABLE): HCPCS | Performed by: SURGERY

## 2025-01-03 PROCEDURE — 6360000002 HC RX W HCPCS: Performed by: SURGERY

## 2025-01-03 PROCEDURE — 7100000010 HC PHASE II RECOVERY - FIRST 15 MIN: Performed by: SURGERY

## 2025-01-03 PROCEDURE — 88302 TISSUE EXAM BY PATHOLOGIST: CPT

## 2025-01-03 PROCEDURE — 3700000001 HC ADD 15 MINUTES (ANESTHESIA): Performed by: SURGERY

## 2025-01-03 PROCEDURE — 80305 DRUG TEST PRSMV DIR OPT OBS: CPT

## 2025-01-03 PROCEDURE — 3600000002 HC SURGERY LEVEL 2 BASE: Performed by: SURGERY

## 2025-01-03 PROCEDURE — 3700000000 HC ANESTHESIA ATTENDED CARE: Performed by: SURGERY

## 2025-01-03 PROCEDURE — 2709999900 HC NON-CHARGEABLE SUPPLY: Performed by: SURGERY

## 2025-01-03 DEVICE — BARD MESH, 3" X 6" (7.6 CM X 15 CM)
Type: IMPLANTABLE DEVICE | Site: GROIN | Status: FUNCTIONAL
Brand: BARD

## 2025-01-03 RX ORDER — FENTANYL CITRATE 50 UG/ML
INJECTION, SOLUTION INTRAMUSCULAR; INTRAVENOUS
Status: DISCONTINUED | OUTPATIENT
Start: 2025-01-03 | End: 2025-01-03 | Stop reason: SDUPTHER

## 2025-01-03 RX ORDER — MIDAZOLAM HYDROCHLORIDE 1 MG/ML
INJECTION, SOLUTION INTRAMUSCULAR; INTRAVENOUS
Status: DISCONTINUED | OUTPATIENT
Start: 2025-01-03 | End: 2025-01-03 | Stop reason: SDUPTHER

## 2025-01-03 RX ORDER — FENTANYL CITRATE 50 UG/ML
INJECTION, SOLUTION INTRAMUSCULAR; INTRAVENOUS
Status: COMPLETED
Start: 2025-01-03 | End: 2025-01-03

## 2025-01-03 RX ORDER — OXYCODONE AND ACETAMINOPHEN 5; 325 MG/1; MG/1
1 TABLET ORAL EVERY 6 HOURS PRN
Qty: 20 TABLET | Refills: 0 | Status: SHIPPED | OUTPATIENT
Start: 2025-01-03 | End: 2025-01-08

## 2025-01-03 RX ORDER — SUCCINYLCHOLINE/SOD CL,ISO/PF 200MG/10ML
SYRINGE (ML) INTRAVENOUS
Status: DISCONTINUED | OUTPATIENT
Start: 2025-01-03 | End: 2025-01-03 | Stop reason: SDUPTHER

## 2025-01-03 RX ORDER — GLYCOPYRROLATE 0.2 MG/ML
INJECTION INTRAMUSCULAR; INTRAVENOUS
Status: DISCONTINUED | OUTPATIENT
Start: 2025-01-03 | End: 2025-01-03 | Stop reason: SDUPTHER

## 2025-01-03 RX ORDER — DEXAMETHASONE SODIUM PHOSPHATE 10 MG/ML
INJECTION, EMULSION INTRAMUSCULAR; INTRAVENOUS
Status: DISCONTINUED | OUTPATIENT
Start: 2025-01-03 | End: 2025-01-03 | Stop reason: SDUPTHER

## 2025-01-03 RX ORDER — ONDANSETRON 2 MG/ML
INJECTION INTRAMUSCULAR; INTRAVENOUS
Status: DISCONTINUED | OUTPATIENT
Start: 2025-01-03 | End: 2025-01-03 | Stop reason: SDUPTHER

## 2025-01-03 RX ORDER — SODIUM CHLORIDE 9 MG/ML
INJECTION, SOLUTION INTRAVENOUS CONTINUOUS
Status: DISCONTINUED | OUTPATIENT
Start: 2025-01-03 | End: 2025-01-03 | Stop reason: HOSPADM

## 2025-01-03 RX ORDER — BUPIVACAINE HYDROCHLORIDE 5 MG/ML
INJECTION, SOLUTION PERINEURAL PRN
Status: DISCONTINUED | OUTPATIENT
Start: 2025-01-03 | End: 2025-01-03 | Stop reason: ALTCHOICE

## 2025-01-03 RX ORDER — HYDROMORPHONE HYDROCHLORIDE 2 MG/ML
INJECTION, SOLUTION INTRAMUSCULAR; INTRAVENOUS; SUBCUTANEOUS
Status: DISCONTINUED | OUTPATIENT
Start: 2025-01-03 | End: 2025-01-03 | Stop reason: SDUPTHER

## 2025-01-03 RX ORDER — ROCURONIUM BROMIDE 10 MG/ML
INJECTION, SOLUTION INTRAVENOUS
Status: DISCONTINUED | OUTPATIENT
Start: 2025-01-03 | End: 2025-01-03 | Stop reason: SDUPTHER

## 2025-01-03 RX ORDER — PROPOFOL 10 MG/ML
INJECTION, EMULSION INTRAVENOUS
Status: DISCONTINUED | OUTPATIENT
Start: 2025-01-03 | End: 2025-01-03 | Stop reason: SDUPTHER

## 2025-01-03 RX ORDER — M-VIT,TX,IRON,MINS/CALC/FOLIC 27MG-0.4MG
1 TABLET ORAL DAILY
COMMUNITY

## 2025-01-03 RX ORDER — FENTANYL CITRATE 50 UG/ML
50 INJECTION, SOLUTION INTRAMUSCULAR; INTRAVENOUS EVERY 5 MIN PRN
Status: DISCONTINUED | OUTPATIENT
Start: 2025-01-03 | End: 2025-01-03 | Stop reason: HOSPADM

## 2025-01-03 RX ORDER — FENTANYL CITRATE 50 UG/ML
INJECTION, SOLUTION INTRAMUSCULAR; INTRAVENOUS
Status: DISCONTINUED
Start: 2025-01-03 | End: 2025-01-03 | Stop reason: WASHOUT

## 2025-01-03 RX ADMIN — ROCURONIUM BROMIDE 40 MG: 10 INJECTION INTRAVENOUS at 08:11

## 2025-01-03 RX ADMIN — HYDROMORPHONE HYDROCHLORIDE 0.5 MG: 1 INJECTION, SOLUTION INTRAMUSCULAR; INTRAVENOUS; SUBCUTANEOUS at 10:01

## 2025-01-03 RX ADMIN — MIDAZOLAM 2 MG: 1 INJECTION INTRAMUSCULAR; INTRAVENOUS at 08:00

## 2025-01-03 RX ADMIN — HYDROMORPHONE HYDROCHLORIDE 0.5 MG: 1 INJECTION, SOLUTION INTRAMUSCULAR; INTRAVENOUS; SUBCUTANEOUS at 09:56

## 2025-01-03 RX ADMIN — ROCURONIUM BROMIDE 20 MG: 10 INJECTION INTRAVENOUS at 08:22

## 2025-01-03 RX ADMIN — ROCURONIUM BROMIDE 10 MG: 10 INJECTION INTRAVENOUS at 08:42

## 2025-01-03 RX ADMIN — FENTANYL CITRATE 50 MCG: 50 INJECTION, SOLUTION INTRAMUSCULAR; INTRAVENOUS at 09:46

## 2025-01-03 RX ADMIN — HYDROMORPHONE HYDROCHLORIDE 0.5 MG: 2 INJECTION INTRAMUSCULAR; INTRAVENOUS; SUBCUTANEOUS at 09:23

## 2025-01-03 RX ADMIN — GLYCOPYRROLATE 0.2 MG: 0.2 INJECTION INTRAMUSCULAR; INTRAVENOUS at 08:43

## 2025-01-03 RX ADMIN — HYDROMORPHONE HYDROCHLORIDE 0.5 MG: 2 INJECTION INTRAMUSCULAR; INTRAVENOUS; SUBCUTANEOUS at 08:22

## 2025-01-03 RX ADMIN — SODIUM CHLORIDE: 9 INJECTION, SOLUTION INTRAVENOUS at 09:16

## 2025-01-03 RX ADMIN — FENTANYL CITRATE 50 MCG: 50 INJECTION, SOLUTION INTRAMUSCULAR; INTRAVENOUS at 09:51

## 2025-01-03 RX ADMIN — ROCURONIUM BROMIDE 10 MG: 10 INJECTION INTRAVENOUS at 09:09

## 2025-01-03 RX ADMIN — HYDROMORPHONE HYDROCHLORIDE 0.5 MG: 2 INJECTION INTRAMUSCULAR; INTRAVENOUS; SUBCUTANEOUS at 09:35

## 2025-01-03 RX ADMIN — FENTANYL CITRATE 100 MCG: 50 INJECTION, SOLUTION INTRAMUSCULAR; INTRAVENOUS at 08:00

## 2025-01-03 RX ADMIN — SODIUM CHLORIDE: 9 INJECTION, SOLUTION INTRAVENOUS at 06:38

## 2025-01-03 RX ADMIN — ONDANSETRON 4 MG: 2 INJECTION INTRAMUSCULAR; INTRAVENOUS at 08:08

## 2025-01-03 RX ADMIN — WATER 2000 MG: 1 INJECTION INTRAMUSCULAR; INTRAVENOUS; SUBCUTANEOUS at 07:55

## 2025-01-03 RX ADMIN — DEXAMETHASONE SODIUM PHOSPHATE 4 MG: 10 INJECTION, EMULSION INTRAMUSCULAR; INTRAVENOUS at 08:08

## 2025-01-03 RX ADMIN — SUGAMMADEX 400 MG: 100 INJECTION, SOLUTION INTRAVENOUS at 09:33

## 2025-01-03 RX ADMIN — Medication 200 MG: at 08:05

## 2025-01-03 RX ADMIN — HYDROMORPHONE HYDROCHLORIDE 0.5 MG: 2 INJECTION INTRAMUSCULAR; INTRAVENOUS; SUBCUTANEOUS at 08:13

## 2025-01-03 RX ADMIN — PROPOFOL 200 MG: 10 INJECTION, EMULSION INTRAVENOUS at 08:05

## 2025-01-03 ASSESSMENT — PAIN - FUNCTIONAL ASSESSMENT
PAIN_FUNCTIONAL_ASSESSMENT: 0-10
PAIN_FUNCTIONAL_ASSESSMENT: 0-10

## 2025-01-03 ASSESSMENT — PAIN DESCRIPTION - DESCRIPTORS: DESCRIPTORS: SORE

## 2025-01-03 ASSESSMENT — ENCOUNTER SYMPTOMS: ABDOMINAL PAIN: 1

## 2025-01-03 ASSESSMENT — PAIN SCALES - GENERAL
PAINLEVEL_OUTOF10: 2
PAINLEVEL_OUTOF10: 7
PAINLEVEL_OUTOF10: 7
PAINLEVEL_OUTOF10: 2
PAINLEVEL_OUTOF10: 2
PAINLEVEL_OUTOF10: 7
PAINLEVEL_OUTOF10: 7
PAINLEVEL_OUTOF10: 2

## 2025-01-03 NOTE — PROGRESS NOTES
0941 pt arrived to PACU, awakens to voice. VSS.   0946 c/o pain 7/10, medicated with 50 mcg fentanyl  0951 no change in pain status, medicated with 50 mcg fentanyl  0956 no change in pain status, medicated with 0.5 mg dilaudid  1001 no change in pain status, medicated with 0.5 mg dilaudid  1006 pt states pain 2/10 and tolerable. VSS  1015 pt awake in bed, states pain 2/10 and tolerable. VSS  1021 meets criteria for discharge from PACU, transported to \[facility]\"" in stable condition

## 2025-01-03 NOTE — DISCHARGE INSTRUCTIONS
DR. CONNOR'S DISCHARGE INSTRUCTIONS    Pt Name: Nicolas Lara  Medical Record Number: [id number]  Today's Date: 1/3/2025  GENERAL ANESTHESIA OR SEDATION   1. Do not drive or operate hazardous machinery for 24 hours.  2. Do not make important business or personal decisions for 24 hours.  3. Do not drink alcoholic beverages or use tobacco for 24 hours.  ACTIVITY INSTRUCTIONS   Rest today. Resume light to normal activity tomorrow.  You may resume normal activity tomorrow. Do not engage in strenuous activity that may place stress on your incision.  Do not drive for 3-5 days and avoid heavy lifting, tugging, pullings greater than 10-20 lbs until seen in the office.    DIET INSTRUCTIONS   Begin with clear liquids. If not nauseated, may increase to a low-fat diet when you desire. Greasy and spicy foods are not advised.  Regular diet as tolerated.  MEDICATIONS   Prescription written for ***. Take as directed.  Do not drink alcohol or drive while taking pain medications. You may experience dizziness or drowsiness with these medications. You may also experience constipation which can be relieved with stool softners or laxatives.  You may resume your daily prescription medication schedule unless otherwise specified.  Do not take 325mg Aspirin or other blood thinners such as Coumadin or Plavix for 5 days.  WOUND & DRESSING INSTRUCTIONS   Always ensure you and your care giver clean hands before and after caring for the wound.  Keep dressing clean and dry for 48 hours. Change when soiled or wet.      Allow steri-strips to fall off on their own if present, allow surgical glue to peel off on its own  Ice operative site for 20 minutes 4 times a day.     May wash over incision in shower in 48 hours, but do not soak in a bath.    ABDOMINAL & LAPAROSCOPIC PROCEDURES   1. You are encouraged to get up and move around as this helps with the circulation and speeds up the healing process.  2. Breath deeply and cough from time to time.  This helps to clear your lungs and helps prevent pneumonia.  3. Supporting your incision with a pillow or your hand helps to minimize discomfort and pain.  4. Laparoscopic patients may develop shoulder pain in the first 48 hours from the gas used during the procedure.  FOLLOW UP CARE, SPECIFICALLY WATCH FOR:    Fever over 101 degrees by mouth   Increased redness, warmth, hardness at operative site.   Blood soaked dressing (small amounts of oozing may be normal.)   Increased or progressive drainage from the surgical area   Inability to urinate or blood in the urine   Pain not relieved by the medications ordered   Persistent nausea and/or vomiting, unable to retain fluids.    FOLLOW-UP APPOINTMENT in 2 weeks    Call my office if you have any problem that concerns you, [phone]. After hours, you can reach the answering service via the office phone number. IF YOU NEED IMMEDIATE ATTENTION, GO TO THE EMERGENCY ROOM AND YOUR DOCTOR WILL BE CONTACTED.    Alisha Hearn MD  [address] [place] [address] [address]  [place] [address]  Electronically signed by Alisha Hearn MD on 1/3/2025 at 7:56 AM

## 2025-01-03 NOTE — ANESTHESIA PRE PROCEDURE
Department of Anesthesiology  Preprocedure Note       Name:  Nicolas Lara   Age:  54 y.o.  :  1970                                          MRN:  [id number]         Date:  1/3/2025      Surgeon: Surgeon(s):  Alisha Hearn MD    Procedure: Procedure(s):  Open right inguinal hernia with mesh    Medications prior to admission:   Prior to Admission medications    Medication Sig Start Date End Date Taking? Authorizing Provider   Multiple Vitamins-Minerals (THERAPEUTIC MULTIVITAMIN-MINERALS) tablet Take 1 tablet by mouth daily   Yes ProviderOnesimo MD   oxyCODONE-acetaminophen (PERCOCET) 5-325 MG per tablet Take 1 tablet by mouth every 6 hours as needed for Pain for up to 5 days. Intended supply: 5 days. Take lowest dose possible to manage pain Max Daily Amount: 4 tablets 1/3/25 1/8/25 Yes Alisha Hearn MD   lisinopril-hydroCHLOROthiazide (PRINZIDE;ZESTORETIC) 10-12.5 MG per tablet Take 1 tablet by mouth daily   Yes Onesimo Sifuentes MD       Current medications:    Current Facility-Administered Medications   Medication Dose Route Frequency Provider Last Rate Last Admin   • 0.9 % sodium chloride infusion   IntraVENous Continuous Neeta Arriaza, APRN - CNP 20 mL/hr at 25 0800 NoRateChange at 25 08     Facility-Administered Medications Ordered in Other Encounters   Medication Dose Route Frequency Provider Last Rate Last Admin   • midazolam (VERSED) injection   IntraVENous Once PRN Bhupendra Rodriguez MD   2 mg at 25 0800   • fentaNYL (SUBLIMAZE) injection   IntraVENous Once PRN Bhupendra Rodriguez MD   100 mcg at 25 0800   • rocuronium (ZEMURON) injection   IntraVENous Once PRN Bhupendra Rodriguez MD   40 mg at 25 0811   • HYDROmorphone (DILAUDID) injection   IntraVENous Once PRN Bhupendra Rodriguez MD   0.5 mg at 25 0813   • propofol infusion   IntraVENous Once PRN Bhupendra Rodriguez MD   200 mg at 25 0805   • succinylcholine (ANECTINE) injection   IntraVENous Once PRN Bhupendra Rodriguez

## 2025-01-03 NOTE — OP NOTE
sac was opened, there was the cecum and small bowel.  The patient was placed in headdown position and this was reduced back into the abdomen.  The wall of the hernia sac was the cecum consistent with a sliding hernia.  After all the bowel was returned.  The hemostat was placed across the hernia sac.  The hernia sac was then dissected away from the cord structures protecting the vasa as and blood supply.  Dissection was done down to the level of the internal ring.  The shelving edge had been cleared.  Cord structures were elevated to get out of the way and the pelvic floor was inspected.  A piece of polypropylene mesh was then secured to the pubic tubercle.  A lateral flap was made.  It was then secured to the shelving edge with interrupted Nurolon sutures.  Medially it was secured to the conjoined tendon.  At the level of the internal ring, the flaps were crossed the internal ring was recreated.  The ring was large enough to accommodate the hemostat but not large enough to accommodate a finger.  Surgical site was irrigated.  It was hemostatic.  The aponeurosis of the external oblique was then reapproximated with a running Vicryl suture.  Colt's fascia was reapproximated with interrupted Vicryl.  The skin was run closed.  Surgical glue was applied patient was woken anesthesia and transferred the PACU in stable condition all counts were correct    Electronically signed by Alisha Hearn MD on 1/3/2025 at 11:20 AM

## 2025-01-03 NOTE — PROGRESS NOTES
Patient caught vaping in room by this RN. Rn Notified Jessie and educated patient on no vaping/smoking in hospital.    Pt has met discharge criteria and states he is ready for discharge to home. IV removed, gauze and tape applied. Dressed in own clothes and personal belongings gathered. Discharge instructions (with opioid medication education information) given to pt and family; pt and family verbalized understanding of discharge instructions, prescriptions and follow up appointments. Pt transported to discharge lobby by [facility] staff.

## 2025-01-03 NOTE — H&P
Alisha Hearn MD  General Surgery Clinic H&P    Pt Name: Nicolas Lara  MRN: [id number]  YOB: 1970  Date of evaluation: 12/05/2024    Primary Care Physician: Urbano Elizabeth MD  Patient evaluated at the request of  self   Reason for evaluation: right inguinal hernia  IMPRESSIONS:   No diagnosis found.  does not have any pertinent problems on file.    PLANS:   Patient with large inguinal scrotal hernia containing loops of bowel in his right hemiscrotum.  I discussed with him due to the size a feel that an open repair would be in his best interest.  I discussed him the procedure how was performed the risk the benefits alternatives risk include but not limited to bleeding infection demonstrating structures nerve entrapment mesh infection need for more procedures were all discussed.  Patient understands the importance of no heavy lifting or strenuous activity after surgery to minimize reoccurrence.  Due to the large site of his hernia he may benefit from a drain placed into his scrotal cavity.  He expressed an understanding.  All of his questions were answered and he like to proceed with open right inguinal hernia repair with mesh    SUBJECTIVE:   CC: Right inguinal hernia    History of Chief Complaint:    Nicolas is a 54 y.o.male who has a history of remote left inguinal hernia repair.  Patient states 2 years ago while at work someone fell and he grabbed him and he felt him initial pull and pop in his right groin.  Since then he is continue to have an enlarging hernia, he now presents with what he describes as a very large right inguinal hernia, he says he is able to partially reduce it.  It is making it hard for him to participate in regular activities.  Denies any change in bowel habits    Medical History  Past Medical History:   Diagnosis Date    Hernia 01/01/2009    inguinal hernia    Kidney stones        Past Surgical History  Past Surgical History:   Procedure Laterality Date    INGUINAL

## 2025-01-03 NOTE — PROGRESS NOTES
Patient oriented to Same Day department and admitted to Same Day Surgery room 7.   Patient verbalized approval for first name, last initial with physician name on unit whiteboard.     Plan of care reviewed with patient.   Patient room whiteboard filled out and discussed with patient and responsible adult.   Patient and responsible adult offered Same Day Welcome Packet to review.    Call light in reach.   Bed in lowest position, locked, with one bed rail up.   SCDs and warming blanket in place.  Appropriate arm bands on patient.   Bathroom offered.   All questions and concerns of patient addressed.        Meds to Beds:   Patient informed of [facility]eds program during admission. Patient is agreeable to program.   Contact information for the pharmacy and the Meds to Beds program:   Name: Samantha Arminda    Relationship to patient:child   Phone number: [phone]

## 2025-01-03 NOTE — ANESTHESIA POSTPROCEDURE EVALUATION
Department of Anesthesiology  Postprocedure Note    Patient: Nicolas Lara  MRN: [id number]  YOB: 1970  Date of evaluation: 1/3/2025    Procedure Summary       Date: 01/03/25 Room / Location: [facility] OR 05 / [facility] OR    Anesthesia Start: 0800 Anesthesia Stop: 0943    Procedure: Open right inguinal hernia with mesh (Right: Groin) Diagnosis:       Inguinal hernia, right      (Inguinal hernia, right [K40.90])    Surgeons: Alisha Hearn MD Responsible Provider: Bhupendra Rodriguez MD    Anesthesia Type: general ASA Status: 3            Anesthesia Type: No value filed.    Basilio Phase I: Basilio Score: 8    Basilio Phase II:      Anesthesia Post Evaluation    Patient location during evaluation: PACU  Patient participation: complete - patient participated  Level of consciousness: awake and alert  Airway patency: patent  Nausea & Vomiting: no nausea  Cardiovascular status: blood pressure returned to baseline and hemodynamically stable  Respiratory status: acceptable and spontaneous ventilation  Hydration status: euvolemic  Pain management: adequate    No notable events documented.

## 2025-01-06 ENCOUNTER — TELEPHONE (OUTPATIENT)
Dept: SURGERY | Age: 55
End: 2025-01-06

## 2025-01-06 NOTE — TELEPHONE ENCOUNTER
Post procedural phone call placed to patient. Patient utilizing prescribed medication with adequate/inadequate control of pain. Patient education against constipation, recommended increased fluid intake, progressive ambulation and stool softeners/stimulants as directed. Patient able/unable to readback education. Patients follow up appointment verified and confirmed in chart. Time spent for patient questions. Patient to follow up with office as needed.

## 2025-01-07 ENCOUNTER — TELEPHONE (OUTPATIENT)
Dept: SURGERY | Age: 55
End: 2025-01-07

## 2025-01-16 ENCOUNTER — OFFICE VISIT (OUTPATIENT)
Dept: SURGERY | Age: 55
End: 2025-01-16

## 2025-01-16 VITALS
TEMPERATURE: 97.7 F | BODY MASS INDEX: 38.44 KG/M2 | SYSTOLIC BLOOD PRESSURE: 162 MMHG | OXYGEN SATURATION: 99 % | HEIGHT: 71 IN | HEART RATE: 40 BPM | DIASTOLIC BLOOD PRESSURE: 98 MMHG

## 2025-01-16 DIAGNOSIS — Z98.890 S/P INGUINAL HERNIA REPAIR: Primary | ICD-10-CM

## 2025-01-16 DIAGNOSIS — Z87.19 S/P INGUINAL HERNIA REPAIR: Primary | ICD-10-CM

## 2025-01-16 PROCEDURE — 99024 POSTOP FOLLOW-UP VISIT: CPT | Performed by: NURSE PRACTITIONER

## 2025-01-16 ASSESSMENT — ENCOUNTER SYMPTOMS
SHORTNESS OF BREATH: 0
VOMITING: 0
ABDOMINAL PAIN: 0
NAUSEA: 0

## 2025-01-16 NOTE — PROGRESS NOTES
[facility] [place] [facility]  [facility] GENERAL SURGERY  [address]  [facility] [address]  Dept: [phone]  Dept Fax: [phone]  Loc: [phone]    Visit Date: 1/16/2025       Nicolas Lara is a 54 y.o. male who presents today for:  Chief Complaint   Patient presents with    Post-Op Check     S/P Open right inguinal hernia with mesh 1/3/25       HPI:     Nicolas presents today for a 2 week  post op check.  Today He complains of significant scrotal swelling and discomfort.  Which is expected.  He is not taking narcotic pain medication, encouraged over-the-counter pain medication ibuprofen and Tylenol.  Elevate scrotal sac, cool compresses, good supporting underwear or jockstrap.  Nicolas has no issues voiding, bowels are functioning.  Denies any nausea vomiting, denies fevers.  Incision is approximated does have 6 significant scrotal swelling and tenderness we will continue to monitor.  Remain off of work until March 14 as discussed continue a weight restriction of 10 to 15 pounds.  Plan to follow back up in office in 2 weeks call with any questions or concerns.       Past Medical History:   Diagnosis Date    Hernia 01/01/2009    inguinal hernia    Kidney stones       Past Surgical History:   Procedure Laterality Date    HERNIA REPAIR Right 1/3/2025    Open right inguinal hernia with mesh performed by Alisha Hearn MD at [facility] OR    INGUINAL HERNIA REPAIR Left 09/16/2013    Dr. Almanzar      Family History   Problem Relation Age of Onset    High Blood Pressure Mother     COPD Mother     Obesity Mother     Heart Disease Father     Heart Attack Father      Social History     Tobacco Use    Smoking status: Every Day     Current packs/day: 0.50     Average packs/day: 0.5 packs/day for 20.0 years (10.0 ttl pk-yrs)     Types: Cigarettes    Smokeless tobacco: Never   Substance Use Topics    Alcohol use: Yes     Comment: occasional         Current Outpatient Medications

## 2025-01-22 PROBLEM — I10 PRIMARY HYPERTENSION: Status: ACTIVE | Noted: 2020-12-22

## 2025-01-22 PROBLEM — F41.1 GENERALIZED ANXIETY DISORDER: Status: ACTIVE | Noted: 2023-10-09

## 2025-01-22 PROBLEM — I73.9 PERIPHERAL VASCULAR DISEASE (HCC): Status: ACTIVE | Noted: 2021-01-07

## 2025-01-22 PROBLEM — E66.01 MORBID OBESITY: Status: ACTIVE | Noted: 2021-01-07

## 2025-01-23 ENCOUNTER — OFFICE VISIT (OUTPATIENT)
Dept: SURGERY | Age: 55
End: 2025-01-23

## 2025-01-23 VITALS
HEART RATE: 94 BPM | TEMPERATURE: 96.3 F | SYSTOLIC BLOOD PRESSURE: 128 MMHG | BODY MASS INDEX: 38.44 KG/M2 | OXYGEN SATURATION: 98 % | HEIGHT: 71 IN | DIASTOLIC BLOOD PRESSURE: 78 MMHG

## 2025-01-23 DIAGNOSIS — Z09 POSTOP CHECK: Primary | ICD-10-CM

## 2025-01-23 NOTE — PROGRESS NOTES
[facility] GENERAL SURGERY  General Surgery  Clinic  Note    Pt Name: Nicolas Lara  Medical Record Number: [id number]  Date of Birth 1970   Today's Date: 01/23/2025      ASSESSMENT/ PLAN:     Assessment & Plan  1. Postoperative status following hernia repair.  The skin edges of the surgical incision are not fully aligned, but there is no evidence of wound dehiscence. The hernia was extensive, and there are no signs of recurrence. The scrotal swelling is expected to persist for up to 6 weeks, with complete resolution potentially taking up to 6 months. The incision site does not exhibit any signs of infection. He has been advised to utilize scrotal support and allow time for the swelling to subside. He has been reassured that the incision will heal appropriately, albeit at a slower pace. He has been instructed to avoid strenuous activities and heavy lifting. The use of Steri-Strips or antibiotic ointment is not necessary; instead, the incision should be left exposed to air.    Follow-up  The patient will follow up with the nurse practitioner on 07/30/2024.    PROCEDURE  The patient underwent hernia surgery.         SUBJECTIVE     History of Present Illness  The patient presents for evaluation of postoperative status following hernia surgery.    He reports persistent scrotal swelling, which has shown some improvement but remains significant. He describes a sensation of tightness and irritation, attributing it to the mesh used in the procedure. He experienced a bout of sneezing, which he believes may have affected the surgical incision. The incision appears to be healing, although with a slight flap. He expresses concern about potential tearing, even though there is no evidence of seepage or bleeding. He is unable to reduce the swelling, which he attributes to fluid accumulation. Despite the swelling, he does not experience any associated pain. He has been diligent in maintaining cleanliness of

## 2025-01-30 ENCOUNTER — OFFICE VISIT (OUTPATIENT)
Dept: SURGERY | Age: 55
End: 2025-01-30

## 2025-01-30 VITALS
SYSTOLIC BLOOD PRESSURE: 140 MMHG | HEART RATE: 97 BPM | HEIGHT: 71 IN | DIASTOLIC BLOOD PRESSURE: 77 MMHG | TEMPERATURE: 98.2 F | OXYGEN SATURATION: 96 % | BODY MASS INDEX: 38.44 KG/M2

## 2025-01-30 DIAGNOSIS — Z87.19 S/P RIGHT INGUINAL HERNIA REPAIR: Primary | ICD-10-CM

## 2025-01-30 DIAGNOSIS — Z98.890 S/P RIGHT INGUINAL HERNIA REPAIR: Primary | ICD-10-CM

## 2025-01-30 NOTE — PROGRESS NOTES
[facility] [place] [facility]  [facility] GENERAL SURGERY  [address]  [facility] [address]  Dept: [phone]  Dept Fax: [phone]  Loc: [phone]    Visit Date: 1/30/2025       Nicolas Lara is a 54 y.o. male who presents today for:  Chief Complaint   Patient presents with    Post-Op Check     S/P Open right inguinal hernia with mesh 1/3/25-Last seen 1/23/25       HPI:     Nicolas presents today for a  post op check.  He has persistent scrotal swelling and firmness.  Daughter denies any heavy lifting.  Continue scrotal support and cool compresses.  Reiterated that the swelling could last for up to 2 months or more after surgery.  He has no issues voiding, bowels are functioning does not have any increased pain.  He is tolerating meals denies any nausea vomiting.  Continue to follow lifting restrictions as discussed.  No concerns with incision no signs of infection.  No signs of hernia recurrence.  Verbalized understanding of all instruction.  Plan to follow back up with the patient in 4 weeks.  Expected to return to work as March 14, 2025.  Call the office with any questions or concerns prior to next appointment.      Past Medical History:   Diagnosis Date    Hernia 01/01/2009    inguinal hernia    Kidney stones       Past Surgical History:   Procedure Laterality Date    HERNIA REPAIR Right 1/3/2025    Open right inguinal hernia with mesh performed by Alisha Hearn MD at [facility] OR    INGUINAL HERNIA REPAIR Left 09/16/2013    Dr. Almanzar      Family History   Problem Relation Age of Onset    High Blood Pressure Mother     COPD Mother     Obesity Mother     Heart Disease Father     Heart Attack Father      Social History     Tobacco Use    Smoking status: Every Day     Current packs/day: 0.50     Average packs/day: 0.5 packs/day for 20.0 years (10.0 ttl pk-yrs)     Types: Cigarettes    Smokeless tobacco: Never   Substance Use Topics    Alcohol use: Yes     Comment:

## 2025-01-31 ASSESSMENT — ENCOUNTER SYMPTOMS
SHORTNESS OF BREATH: 0
NAUSEA: 0
VOMITING: 0
ABDOMINAL PAIN: 0

## 2025-02-10 ENCOUNTER — TELEPHONE (OUTPATIENT)
Dept: SURGERY | Age: 55
End: 2025-02-10

## 2025-02-10 DIAGNOSIS — G89.18 POST-OP PAIN: Primary | ICD-10-CM

## 2025-02-10 RX ORDER — OXYCODONE AND ACETAMINOPHEN 5; 325 MG/1; MG/1
1 TABLET ORAL EVERY 8 HOURS PRN
Qty: 12 TABLET | Refills: 0 | Status: SHIPPED | OUTPATIENT
Start: 2025-02-10 | End: 2025-02-17

## 2025-02-10 NOTE — TELEPHONE ENCOUNTER
Patient called requesting refill for pain medication oxyCODONE-acetaminophen (PERCOCET) 5-325 MG per tablet. Patient states still having pain that has only been relieved with the oxycodone. Has been able to extend and wean mostly off of the medication. Patient was asking for a small script to help get him through for a few more days. If approved please send to [facility] - [address] [phone] - F [phone] [[id number]]

## 2025-02-27 ENCOUNTER — OFFICE VISIT (OUTPATIENT)
Dept: SURGERY | Age: 55
End: 2025-02-27

## 2025-02-27 VITALS
HEIGHT: 72 IN | BODY MASS INDEX: 41.31 KG/M2 | SYSTOLIC BLOOD PRESSURE: 142 MMHG | WEIGHT: 305 LBS | OXYGEN SATURATION: 98 % | HEART RATE: 74 BPM | RESPIRATION RATE: 18 BRPM | TEMPERATURE: 97.4 F | DIASTOLIC BLOOD PRESSURE: 80 MMHG

## 2025-02-27 DIAGNOSIS — Z98.890 S/P RIGHT INGUINAL HERNIA REPAIR: Primary | ICD-10-CM

## 2025-02-27 DIAGNOSIS — Z87.19 S/P RIGHT INGUINAL HERNIA REPAIR: Primary | ICD-10-CM

## 2025-02-27 ASSESSMENT — ENCOUNTER SYMPTOMS
VOMITING: 0
ABDOMINAL PAIN: 0
SHORTNESS OF BREATH: 0
NAUSEA: 0

## 2025-02-27 NOTE — PROGRESS NOTES
patient questionsanswered.  Pt voiced understanding.     Electronically signed by MENDEZ Meehan CNP on 2/27/2025 at 2:25 PM

## 2025-02-27 NOTE — PATIENT INSTRUCTIONS
Remain off of work until April 14.  Plan to follow back up in the office in 4 weeks, call the office with any questions or concerns.  Continue lifting restrictions as discussed.    Keep scrotal sac elevated by supportive garments

## 2025-03-27 ENCOUNTER — OFFICE VISIT (OUTPATIENT)
Dept: SURGERY | Age: 55
End: 2025-03-27

## 2025-03-27 VITALS
WEIGHT: 301.9 LBS | OXYGEN SATURATION: 98 % | BODY MASS INDEX: 40.89 KG/M2 | DIASTOLIC BLOOD PRESSURE: 90 MMHG | SYSTOLIC BLOOD PRESSURE: 148 MMHG | HEIGHT: 72 IN | HEART RATE: 82 BPM | TEMPERATURE: 97.3 F

## 2025-03-27 DIAGNOSIS — N50.89 SCROTAL SWELLING: ICD-10-CM

## 2025-03-27 DIAGNOSIS — Z98.890 S/P RIGHT INGUINAL HERNIA REPAIR: Primary | ICD-10-CM

## 2025-03-27 DIAGNOSIS — R10.30 LOWER ABDOMINAL PAIN: ICD-10-CM

## 2025-03-27 DIAGNOSIS — Z87.19 S/P RIGHT INGUINAL HERNIA REPAIR: Primary | ICD-10-CM

## 2025-03-27 ASSESSMENT — ENCOUNTER SYMPTOMS
VOMITING: 0
ABDOMINAL PAIN: 0
NAUSEA: 0
SHORTNESS OF BREATH: 0

## 2025-03-27 NOTE — PROGRESS NOTES
[facility] [place] [facility]  [facility] GENERAL SURGERY  [address]  [facility] [address]  Dept: [phone]  Dept Fax: [phone]  Loc: [phone]    Visit Date: 3/27/2025       Nicolas Lara is a 54 y.o. male who presents today for:  Chief Complaint   Patient presents with    Post-Op Check     S/P Open right inguinal hernia with mesh 1/3/25-Last seen 2/27/25       HPI:     Nicolas presents today for a 12 week post op check.  Today He complains of persistent scrotal swelling.  He continues to have discomfort and heaviness.  He has no issues voiding.  He is tolerating meals, denies any nausea vomiting.  Having bowel function.  He has not been doing any lifting.  He continues to be off work.  He does not feel like he can return to work, we will order a CT scan of the pelvis to rule out hernia recurrence.  Plan to follow-up back in office after CT scan is completed.  Call the office with any questions or concerns      Past Medical History:   Diagnosis Date    Hernia 01/01/2009    inguinal hernia    Kidney stones       Past Surgical History:   Procedure Laterality Date    HERNIA REPAIR Right 1/3/2025    Open right inguinal hernia with mesh performed by Alisha Hearn MD at [facility] OR    INGUINAL HERNIA REPAIR Left 09/16/2013    Dr. Almanzar      Family History   Problem Relation Age of Onset    High Blood Pressure Mother     COPD Mother     Obesity Mother     Heart Disease Father     Heart Attack Father      Social History     Tobacco Use    Smoking status: Every Day     Current packs/day: 0.50     Average packs/day: 0.5 packs/day for 20.0 years (10.0 ttl pk-yrs)     Types: Cigarettes    Smokeless tobacco: Never   Substance Use Topics    Alcohol use: Yes     Comment: occasional         Current Outpatient Medications   Medication Sig Dispense Refill    Multiple Vitamins-Minerals (THERAPEUTIC MULTIVITAMIN-MINERALS) tablet Take 1 tablet by mouth daily

## 2025-04-16 ENCOUNTER — RESULTS FOLLOW-UP (OUTPATIENT)
Dept: SURGERY | Age: 55
End: 2025-04-16

## 2025-04-16 ENCOUNTER — HOSPITAL ENCOUNTER (OUTPATIENT)
Dept: CT IMAGING | Age: 55
Discharge: HOME | End: 2025-04-16
Attending: NURSE PRACTITIONER
Payer: COMMERCIAL

## 2025-04-16 ENCOUNTER — TELEPHONE (OUTPATIENT)
Dept: SURGERY | Age: 55
End: 2025-04-16

## 2025-04-16 DIAGNOSIS — T88.8XXA FLUID COLLECTION AT SURGICAL SITE, INITIAL ENCOUNTER: Primary | ICD-10-CM

## 2025-04-16 DIAGNOSIS — N50.89 SCROTAL SWELLING: ICD-10-CM

## 2025-04-16 DIAGNOSIS — Z98.890 S/P RIGHT INGUINAL HERNIA REPAIR: ICD-10-CM

## 2025-04-16 DIAGNOSIS — Z87.19 S/P RIGHT INGUINAL HERNIA REPAIR: ICD-10-CM

## 2025-04-16 DIAGNOSIS — Z87.19 STATUS POST INGUINAL HERNIA REPAIR: ICD-10-CM

## 2025-04-16 DIAGNOSIS — R10.30 LOWER ABDOMINAL PAIN: ICD-10-CM

## 2025-04-16 DIAGNOSIS — Z98.890 STATUS POST INGUINAL HERNIA REPAIR: ICD-10-CM

## 2025-04-16 PROCEDURE — 6360000004 HC RX CONTRAST MEDICATION: Performed by: NURSE PRACTITIONER

## 2025-04-16 PROCEDURE — 74177 CT ABD & PELVIS W/CONTRAST: CPT

## 2025-04-16 RX ORDER — IOPAMIDOL 755 MG/ML
85 INJECTION, SOLUTION INTRAVASCULAR
Status: COMPLETED | OUTPATIENT
Start: 2025-04-16 | End: 2025-04-16

## 2025-04-16 RX ADMIN — IOPAMIDOL 85 ML: 755 INJECTION, SOLUTION INTRAVENOUS at 07:36

## 2025-04-16 NOTE — TELEPHONE ENCOUNTER
Called to discuss CT results.  Has large fluid collection, Interventional radiology will call for appt time to drain.  Cancel appt for Tuesday and will reschedule once it is drained.  There is no hernia recurrence

## 2025-04-18 DIAGNOSIS — N50.89 SCROTAL SWELLING: Primary | ICD-10-CM

## 2025-04-18 DIAGNOSIS — T88.8XXA FLUID COLLECTION AT SURGICAL SITE, INITIAL ENCOUNTER: ICD-10-CM

## 2025-04-28 RX ORDER — SODIUM CHLORIDE 450 MG/100ML
INJECTION, SOLUTION INTRAVENOUS CONTINUOUS
OUTPATIENT
Start: 2025-04-28

## 2025-04-28 RX ORDER — FENTANYL CITRATE 50 UG/ML
50 INJECTION, SOLUTION INTRAMUSCULAR; INTRAVENOUS ONCE
Refills: 0 | OUTPATIENT
Start: 2025-04-28 | End: 2025-04-28

## 2025-04-28 RX ORDER — MIDAZOLAM HYDROCHLORIDE 1 MG/ML
1 INJECTION, SOLUTION INTRAMUSCULAR; INTRAVENOUS ONCE
OUTPATIENT
Start: 2025-04-28 | End: 2025-04-28

## 2025-05-12 ENCOUNTER — HOSPITAL ENCOUNTER (OUTPATIENT)
Dept: ULTRASOUND IMAGING | Age: 55
Discharge: HOME | End: 2025-05-12
Payer: COMMERCIAL

## 2025-05-12 DIAGNOSIS — T88.8XXA FLUID COLLECTION AT SURGICAL SITE, INITIAL ENCOUNTER: ICD-10-CM

## 2025-05-12 DIAGNOSIS — N50.89 SCROTAL SWELLING: ICD-10-CM

## 2025-05-12 DIAGNOSIS — T88.8XXA FLUID COLLECTION AT SURGICAL SITE, INITIAL ENCOUNTER: Primary | ICD-10-CM

## 2025-05-12 PROCEDURE — 87070 CULTURE OTHR SPECIMN AEROBIC: CPT

## 2025-05-12 PROCEDURE — 87075 CULTR BACTERIA EXCEPT BLOOD: CPT

## 2025-05-12 PROCEDURE — 10160 PNXR ASPIR ABSC HMTMA BULLA: CPT

## 2025-05-12 PROCEDURE — 87205 SMEAR GRAM STAIN: CPT

## 2025-05-12 PROCEDURE — 10030 IMG GID FLU COLL DRG SFT TIS: CPT

## 2025-05-12 NOTE — OP NOTE
Department of Radiology  Post Procedure Progress Note      Pre-Procedure Diagnosis:  scrotal swelling post hernia repair     Procedure Performed:  Hematoma and seroma aspiration     Anesthesia: local     Findings: successful, 200 ml blodd and 250 ml serous fluid aspirated     Immediate Complications:  None    Estimated Blood Loss: minimal    SEE DICTATED PROCEDURE NOTE FOR COMPLETE DETAILS.    Lucio Akhtar MD   5/12/2025 1:57 PM

## 2025-05-12 NOTE — H&P
Formulation and discussion of sedation / procedure plans, risks, benefits, side effects and alternatives with patient and/or responsible adult completed.    History and Physical reviewed and unchanged.    Electronically signed by Lucio Akhtar MD on 5/12/25 at 1:56 PM EDT

## 2025-05-13 ENCOUNTER — OFFICE VISIT (OUTPATIENT)
Dept: SURGERY | Age: 55
End: 2025-05-13

## 2025-05-13 ENCOUNTER — RESULTS FOLLOW-UP (OUTPATIENT)
Dept: SURGERY | Age: 55
End: 2025-05-13

## 2025-05-13 VITALS
OXYGEN SATURATION: 97 % | RESPIRATION RATE: 18 BRPM | DIASTOLIC BLOOD PRESSURE: 82 MMHG | HEART RATE: 82 BPM | SYSTOLIC BLOOD PRESSURE: 126 MMHG

## 2025-05-13 DIAGNOSIS — Z98.890 S/P RIGHT INGUINAL HERNIA REPAIR: ICD-10-CM

## 2025-05-13 DIAGNOSIS — L76.34 POSTOPERATIVE SEROMA OF SUBCUTANEOUS TISSUE AFTER NON-DERMATOLOGIC PROCEDURE: Primary | ICD-10-CM

## 2025-05-13 DIAGNOSIS — Z87.19 S/P RIGHT INGUINAL HERNIA REPAIR: ICD-10-CM

## 2025-05-14 ASSESSMENT — ENCOUNTER SYMPTOMS
SHORTNESS OF BREATH: 0
NAUSEA: 0
VOMITING: 0
ABDOMINAL PAIN: 0

## 2025-05-14 NOTE — PROGRESS NOTES
[facility] [place] [facility]  [facility] GENERAL SURGERY  [address]  [facility] [address]  Dept: [phone]  Dept Fax: [phone]  Loc: [phone]    Visit Date: 5/13/2025       Nicolas Lara is a 54 y.o. male who presents today for:  Chief Complaint   Patient presents with    Follow-up     S/P Open right inguinal hernia with mesh 1/3/25-Last seen 3/27/25    Results     CT guided seroma/hematoma drainage 5/12/25       HPI:     Nicolas presents today for a post op check following drainage of hematoma and seroma in interventional radiology yesterday.  Today He complains of persistent swelling, feels like the scrotal sac has swelled back up with fluid.  It is firm to the touch.  The discomfort is slowly getting better.  However he would be unable to return to work at this time.  Will plan to follow back up with the patient in 1 week.  Will ask radiology for another ultrasound with drain placement if still having issues when he is seen next week.  No problems voiding at this time.  He is depressed felt that the issues from surgery would be over at this time and he would have returned back to work.  Discussed talking to PCP about medication.  He denied the need for medication at this time.  Call the office with any other concerns plan to follow back up in 1 week.      Past Medical History:   Diagnosis Date    Hernia 01/01/2009    inguinal hernia    Kidney stones       Past Surgical History:   Procedure Laterality Date    HERNIA REPAIR Right 1/3/2025    Open right inguinal hernia with mesh performed by Alisha Hearn MD at [facility] OR    INGUINAL HERNIA REPAIR Left 09/16/2013    Dr. Almanzar     US ASP ABSCESS/HEMATOMA/BULLA/CYST  5/12/2025    US ASP ABSCESS/HEMATOMA/BULLA/CYST 5/12/2025 Lucio Akhtar MD [facility]TRASOUND    US SOFT TISSUE ABSCESS DRAINAGE PERC  5/12/2025    US SOFT TISSUE ABSCESS DRAINAGE PERC 5/12/2025 Lucio Akhtar MD [facility]     Family History

## 2025-05-17 LAB
BACTERIA SPEC AEROBE CULT: NORMAL
BACTERIA SPEC AEROBE CULT: NORMAL
BACTERIA SPEC ANAEROBE CULT: NORMAL
BACTERIA SPEC ANAEROBE CULT: NORMAL
GRAM STN SPEC: NORMAL
GRAM STN SPEC: NORMAL

## 2025-05-20 ENCOUNTER — OFFICE VISIT (OUTPATIENT)
Dept: SURGERY | Age: 55
End: 2025-05-20

## 2025-05-20 VITALS
TEMPERATURE: 98.2 F | DIASTOLIC BLOOD PRESSURE: 95 MMHG | BODY MASS INDEX: 41.42 KG/M2 | HEIGHT: 72 IN | OXYGEN SATURATION: 96 % | WEIGHT: 305.8 LBS | HEART RATE: 87 BPM | SYSTOLIC BLOOD PRESSURE: 168 MMHG

## 2025-05-20 DIAGNOSIS — Z98.890 S/P RIGHT INGUINAL HERNIA REPAIR: Primary | ICD-10-CM

## 2025-05-20 DIAGNOSIS — Z87.19 S/P RIGHT INGUINAL HERNIA REPAIR: Primary | ICD-10-CM

## 2025-05-20 DIAGNOSIS — L76.34 POSTOPERATIVE SEROMA OF SUBCUTANEOUS TISSUE AFTER NON-DERMATOLOGIC PROCEDURE: Primary | ICD-10-CM

## 2025-05-20 DIAGNOSIS — N50.89 SCROTAL SWELLING: ICD-10-CM

## 2025-05-26 ASSESSMENT — ENCOUNTER SYMPTOMS
VOMITING: 0
ABDOMINAL PAIN: 0
NAUSEA: 0
SHORTNESS OF BREATH: 0

## 2025-05-26 NOTE — PROGRESS NOTES
[facility] [place] [facility]  [facility] GENERAL SURGERY  [address]  [facility] [address]  Dept: [phone]  Dept Fax: [phone]  Loc: [phone]    Visit Date: 5/20/2025       Nicolas Lara is a 54 y.o. male who presents today for:  Chief Complaint   Patient presents with    Follow-up     S/P Open right inguinal hernia with mesh 1/3/25-Last seen 5/13/25       HPI:     Nicolas presents today for a 1 week follow up  post seroma drainage.  Today he was seen in clinic, he is frustrated.  There is scrotal swelling  and firmness.  He states the fluid that was removed previously in the scrotum has filled up again.  He is uncomfortable.  He is still off of work and should remain off until this improves.  Plan a second IR drainage of seroma, Discussed with Dr Akhtar, he is unable to leave a drain.  Plan to follow back up in office after US drainage.  Will have him see Dr Hearn to see if there are any other recommendations at this time.       Past Medical History:   Diagnosis Date    Hernia 01/01/2009    inguinal hernia    Kidney stones       Past Surgical History:   Procedure Laterality Date    HERNIA REPAIR Right 1/3/2025    Open right inguinal hernia with mesh performed by Alisha Hearn MD at [facility] OR    INGUINAL HERNIA REPAIR Left 09/16/2013    Dr. Almanzar     US ASP ABSCESS/HEMATOMA/BULLA/CYST  5/12/2025    US ASP ABSCESS/HEMATOMA/BULLA/CYST 5/12/2025 Lucio Akhtar MD [facility]TR[facility]    US SOFT TISSUE ABSCESS DRAINAGE PERC  5/12/2025    US SOFT TISSUE ABSCESS DRAINAGE PERC 5/12/2025 Lucio Akhtar MD [facility]     Family History   Problem Relation Age of Onset    High Blood Pressure Mother     COPD Mother     Obesity Mother     Heart Disease Father     Heart Attack Father      Social History     Tobacco Use    Smoking status: Every Day     Current packs/day: 0.50     Average packs/day: 0.5 packs/day for 20.0 years (10.0 ttl pk-yrs)     Types: Cigarettes

## 2025-06-09 ENCOUNTER — HOSPITAL ENCOUNTER (OUTPATIENT)
Dept: ULTRASOUND IMAGING | Age: 55
Discharge: HOME | End: 2025-06-09
Payer: COMMERCIAL

## 2025-06-09 DIAGNOSIS — L76.34 POSTOPERATIVE SEROMA OF SUBCUTANEOUS TISSUE AFTER NON-DERMATOLOGIC PROCEDURE: ICD-10-CM

## 2025-06-09 DIAGNOSIS — N50.89 SCROTAL SWELLING: ICD-10-CM

## 2025-06-09 PROCEDURE — 10160 PNXR ASPIR ABSC HMTMA BULLA: CPT

## 2025-06-09 PROCEDURE — 10030 IMG GID FLU COLL DRG SFT TIS: CPT

## 2025-06-09 NOTE — OP NOTE
Department of Radiology  Post Procedure Progress Note      Pre-Procedure Diagnosis:  post op swelling right groin and scrotum right side     Procedure Performed:  US guided aspiration right groin and right scrotum     Anesthesia: local     Findings: successful    Immediate Complications:  None    Estimated Blood Loss: minimal    SEE DICTATED PROCEDURE NOTE FOR COMPLETE DETAILS.    Lucio Akhtar MD   6/9/2025 2:37 PM

## 2025-06-09 NOTE — H&P
Formulation and discussion of sedation / procedure plans, risks, benefits, side effects and alternatives with patient and/or responsible adult completed.    History and Physical reviewed and unchanged.    Electronically signed by Lucio Akhtar MD on 6/9/25 at 2:36 PM EDT

## 2025-06-10 ENCOUNTER — OFFICE VISIT (OUTPATIENT)
Dept: SURGERY | Age: 55
End: 2025-06-10

## 2025-06-10 ENCOUNTER — TELEPHONE (OUTPATIENT)
Dept: SURGERY | Age: 55
End: 2025-06-10

## 2025-06-10 VITALS
HEART RATE: 79 BPM | HEIGHT: 72 IN | SYSTOLIC BLOOD PRESSURE: 164 MMHG | WEIGHT: 300.4 LBS | TEMPERATURE: 97.8 F | DIASTOLIC BLOOD PRESSURE: 96 MMHG | OXYGEN SATURATION: 95 % | BODY MASS INDEX: 40.69 KG/M2

## 2025-06-10 DIAGNOSIS — Z98.890 S/P RIGHT INGUINAL HERNIA REPAIR: Primary | ICD-10-CM

## 2025-06-10 DIAGNOSIS — N50.89 SCROTAL SWELLING: ICD-10-CM

## 2025-06-10 DIAGNOSIS — Z87.19 S/P RIGHT INGUINAL HERNIA REPAIR: Primary | ICD-10-CM

## 2025-06-10 DIAGNOSIS — L76.34 POSTOPERATIVE SEROMA OF SUBCUTANEOUS TISSUE AFTER NON-DERMATOLOGIC PROCEDURE: ICD-10-CM

## 2025-06-10 DIAGNOSIS — R45.851 SUICIDE IDEATION: Primary | ICD-10-CM

## 2025-06-10 PROCEDURE — 99213 OFFICE O/P EST LOW 20 MIN: CPT | Performed by: SURGERY

## 2025-06-10 PROCEDURE — 3077F SYST BP >= 140 MM HG: CPT | Performed by: SURGERY

## 2025-06-10 PROCEDURE — 3080F DIAST BP >= 90 MM HG: CPT | Performed by: SURGERY

## 2025-06-10 RX ORDER — HYDROCODONE BITARTRATE AND ACETAMINOPHEN 5; 325 MG/1; MG/1
1 TABLET ORAL EVERY 6 HOURS PRN
Qty: 10 TABLET | Refills: 0 | Status: SHIPPED | OUTPATIENT
Start: 2025-06-10 | End: 2025-06-13

## 2025-06-10 NOTE — TELEPHONE ENCOUNTER
Can you please sen hydrocodone script to [facility] - [place], [place] - [address] - P [phone] - F [phone] [[id number]]

## 2025-06-10 NOTE — PROGRESS NOTES
[facility] GENERAL SURGERY  General Surgery  Clinic  Note    Pt Name: Nicolas Lara  Medical Record Number: [id number]  Date of Birth 1970   Today's Date: 06/10/2025      ASSESSMENT/ PLAN:     Assessment & Plan  1. Seroma.  The seroma is not indicative of a recurrence but rather an empty space that is gradually filling with fluid. The presence of a small hematoma was also noted. The seroma is not infected, which is a positive sign. However, repeated needle insertions could potentially introduce an infection. The condition may resolve spontaneously over the next few months. Wearing a compressive garment should aid in the healing process. A referral to urology will be made for a second opinion. A prescription for pain medication will be provided.    2. Depression.  The patient reports feeling more depressed and experiencing hallucinations. He has been under significant stress due to work-related issues and personal responsibilities. He has been discussing his mental health with a family  but has not sought professional help. A referral to psychiatry will be made. In the interim, a crisis hotline number was provided for immediate support.         SUBJECTIVE     History of Present Illness  The patient presents for evaluation of a seroma.    He has undergone two instances of seroma drainage, with the most recent reaccumulation occurring within a 48-hour period. He reports an increase in size since his last visit to radiology, despite wearing supportive underwear. The swelling is unilateral, extending to his penis and testicles. The drained fluid is described as milky and pink, not clear. There is concern about potential infection, although no bacterial growth was reported from previous lab results. Dr. Akhtar advised consulting a urologist. The procedure is uncomfortable, and he is seeking stronger pain medication. He wishes to avoid further surgery or anesthesia if possible. He has been 
Other

## 2025-07-01 ENCOUNTER — OFFICE VISIT (OUTPATIENT)
Dept: SURGERY | Age: 55
End: 2025-07-01
Payer: COMMERCIAL

## 2025-07-01 VITALS
HEART RATE: 97 BPM | WEIGHT: 289 LBS | DIASTOLIC BLOOD PRESSURE: 88 MMHG | TEMPERATURE: 97.5 F | HEIGHT: 72 IN | RESPIRATION RATE: 18 BRPM | OXYGEN SATURATION: 98 % | BODY MASS INDEX: 39.14 KG/M2 | SYSTOLIC BLOOD PRESSURE: 138 MMHG

## 2025-07-01 DIAGNOSIS — N50.89 SCROTAL SWELLING: ICD-10-CM

## 2025-07-01 DIAGNOSIS — K40.90 NON-RECURRENT UNILATERAL INGUINAL HERNIA WITHOUT OBSTRUCTION OR GANGRENE: Primary | ICD-10-CM

## 2025-07-01 PROCEDURE — 99213 OFFICE O/P EST LOW 20 MIN: CPT | Performed by: SURGERY

## 2025-07-01 PROCEDURE — 3079F DIAST BP 80-89 MM HG: CPT | Performed by: SURGERY

## 2025-07-01 PROCEDURE — 3075F SYST BP GE 130 - 139MM HG: CPT | Performed by: SURGERY

## 2025-07-01 NOTE — PROGRESS NOTES
[facility] GENERAL SURGERY  General Surgery  Clinic  Note    Pt Name: Nicolas Lara  Medical Record Number: [id number]  Date of Birth 1970   Today's Date: 07/01/2025      ASSESSMENT/ PLAN:     Assessment & Plan  1. Post-surgical seroma.- significantly decreased in size   The seroma has reduced in size and is now smaller than the initial hernia. Continued improvement is expected as the body seals the area. The patient was advised that resolution might take up to 6 months. Wearing compressive garments at night may help facilitate the breakdown of the seroma. While a repeat drainage procedure is an option, it carries risks such as secondary infection and potential mesh infection, which could complicate the situation. The patient prefers to avoid further drainage unless absolutely necessary due to pain and infection risks. Medically, he is cleared to return to work.    Follow-up  A follow-up appointment is scheduled in 2-3 months.         SUBJECTIVE     History of Present Illness  The patient presents for evaluation of swelling.    He has been attempting to consult with a urologist but encountered obstacles with Workmen's Compensation, which requires their approval or personal payment. He has been monitoring the swelling through crude measurements and photographs. Following the second drainage, he observed that the swelling only refilled to about half its previous size. Initially, he was concerned as it seemed to refill immediately, but after 2 to 3 days, there was no further increase. He is considering another drainage procedure unless a urologist consultation is deemed necessary.    His surgery was performed on 01/03/2025. He reports feeling more comfortable in recent weeks, noting that the swelling is now half its original size. He had anticipated returning to work within 3 months post-surgery, but this has not been possible due to his dismissal from his job. He finds it uncomfortable to

## 2025-07-28 ENCOUNTER — OFFICE VISIT (OUTPATIENT)
Dept: UROLOGY | Age: 55
End: 2025-07-28
Payer: COMMERCIAL

## 2025-07-28 ENCOUNTER — TELEPHONE (OUTPATIENT)
Dept: UROLOGY | Age: 55
End: 2025-07-28

## 2025-07-28 VITALS — WEIGHT: 289 LBS | RESPIRATION RATE: 18 BRPM | HEIGHT: 72 IN | BODY MASS INDEX: 39.14 KG/M2

## 2025-07-28 DIAGNOSIS — N43.3 RIGHT HYDROCELE: ICD-10-CM

## 2025-07-28 DIAGNOSIS — K40.90 RIGHT INGUINAL HERNIA: Primary | ICD-10-CM

## 2025-07-28 PROCEDURE — 99204 OFFICE O/P NEW MOD 45 MIN: CPT | Performed by: UROLOGY

## 2025-07-28 NOTE — PROGRESS NOTES
Dr. Bib Tay Jr, MD MD  [facility] Consultation / New Patient Visit    Patient:  Nicolas Lara  YOB: 1970  Date: 7/28/2025  Consult requested from Urbano Elizabeth MD     HISTORY OF PRESENT ILLNESS:   The patient is a 54 y.o. male who presents today for follow-up for the following problem(s): Scrotal swelling  Overall the problem(s) : are worsening.  Associated Symptoms: No dysuria, gross hematuria.   Pain Severity:      Today visit:   7/28/25   Presents with scrotal swelling right inguinal seroma and hydrocele s/p right inguinal hernia repair (Dhiraj).    We reviewed the patients old notes, labs, and imaging independently.     Summary of old records:   (Patient's old records, notes and chart reviewed and summarized above.)    Last several PSA's:  No results found for: \"PSA\"    Last total testosterone:  No results found for: \"TESTOSTERONE\"    Urinalysis today:  No results found for this visit on 07/28/25.      Last BUN and creatinine:  Lab Results   Component Value Date    BUN 17 12/23/2024     Lab Results   Component Value Date    CREATININE 1.2 12/23/2024       Imaging Reviewed during this Office Visit:   (results were independently reviewed by physician and radiology report verified)    PAST MEDICAL, FAMILY AND SOCIAL HISTORY:  Past Medical History:   Diagnosis Date    Hernia 01/01/2009    inguinal hernia    Kidney stones      Past Surgical History:   Procedure Laterality Date    HERNIA REPAIR Right 1/3/2025    Open right inguinal hernia with mesh performed by Alisha Hearn MD at [facility] OR    INGUINAL HERNIA REPAIR Left 09/16/2013    Dr. Almanzar     US ASP ABSCESS/HEMATOMA/BULLA/CYST  5/12/2025    US ASP ABSCESS/HEMATOMA/BULLA/CYST 5/12/2025 Lucio Akhtar MD [facility] ULTRASOUND    US ASP ABSCESS/HEMATOMA/BULLA/CYST  6/9/2025    US ASP ABSCESS/HEMATOMA/BULLA/CYST 6/9/2025 [facility] ULTRASOUND    US SOFT TISSUE ABSCESS DRAINAGE PERC  5/12/2025    US SOFT TISSUE

## 2025-07-28 NOTE — TELEPHONE ENCOUNTER
Patient scheduled for CT  at [facility] on 9/2/25.  Arrival of 1230 PM for a 1 PM scan time.  Order mailed with instructions or given to the patient in the office

## 2025-09-02 ENCOUNTER — HOSPITAL ENCOUNTER (OUTPATIENT)
Dept: CT IMAGING | Age: 55
Discharge: HOME | End: 2025-09-02
Attending: UROLOGY
Payer: COMMERCIAL

## 2025-09-02 DIAGNOSIS — K40.90 RIGHT INGUINAL HERNIA: ICD-10-CM

## 2025-09-02 DIAGNOSIS — N43.3 RIGHT HYDROCELE: ICD-10-CM

## 2025-09-02 PROCEDURE — 74174 CTA ABD&PLVS W/CONTRAST: CPT

## 2025-09-02 PROCEDURE — 6360000004 HC RX CONTRAST MEDICATION: Performed by: UROLOGY

## 2025-09-02 RX ORDER — IOPAMIDOL 755 MG/ML
85 INJECTION, SOLUTION INTRAVASCULAR
Status: COMPLETED | OUTPATIENT
Start: 2025-09-02 | End: 2025-09-02

## 2025-09-02 RX ADMIN — IOPAMIDOL 85 ML: 755 INJECTION, SOLUTION INTRAVENOUS at 13:17

## (undated) DEVICE — BINDER ABD 2XL H12XL60 75IN UNISX STD E 4 PNL DISPOSABLE

## (undated) DEVICE — SUTURE NRLN SZ 0 L18IN NONABSORBABLE BLK L22MM MO-7 1/2 CIR C541D

## (undated) DEVICE — PENROSE DRAIN 18 X .5" SILICONE: Brand: MEDLINE

## (undated) DEVICE — SUTURE VICRYL + SZ 3-0 L27IN ABSRB UD L26MM SH 1/2 CIR VCP416H

## (undated) DEVICE — GLOVE ORANGE PI 7   MSG9070

## (undated) DEVICE — LIQUIBAND RAPID ADHESIVE 36/CS 0.8ML: Brand: MEDLINE

## (undated) DEVICE — PENCIL SMK EVAC ALL IN 1 DSGN ENH VISIBILITY IMPROVED AIR

## (undated) DEVICE — 450 ML BOTTLE OF 0.05% CHLORHEXIDINE GLUCONATE IN 99.95% STERILE WATER FOR IRRIGATION, USP AND APPLICATOR.: Brand: IRRISEPT ANTIMICROBIAL WOUND LAVAGE

## (undated) DEVICE — DRAIN SURG PENROSE 0.25X18 IN CLOSED WND DRAINAGE PREM SIL

## (undated) DEVICE — SUTURE ABSORBABLE BRAIDED 2-0 CT-1 27 IN UD VICRYL J259H

## (undated) DEVICE — GENERAL LAPAROSCOPY-LF: Brand: MEDLINE INDUSTRIES, INC.